# Patient Record
Sex: FEMALE | Race: WHITE | Employment: FULL TIME | URBAN - NONMETROPOLITAN AREA
[De-identification: names, ages, dates, MRNs, and addresses within clinical notes are randomized per-mention and may not be internally consistent; named-entity substitution may affect disease eponyms.]

---

## 2022-04-19 ENCOUNTER — HOSPITAL ENCOUNTER (OUTPATIENT)
Dept: GENERAL RADIOLOGY | Age: 47
Discharge: HOME | End: 2022-04-19
Payer: MEDICAID

## 2022-04-19 ENCOUNTER — HOSPITAL ENCOUNTER (OUTPATIENT)
Dept: MRI IMAGING | Age: 47
Discharge: HOME | End: 2022-04-19
Payer: MEDICAID

## 2022-04-19 DIAGNOSIS — M25.511 RIGHT SHOULDER PAIN, UNSPECIFIED CHRONICITY: ICD-10-CM

## 2022-04-19 PROCEDURE — 73222 MRI JOINT UPR EXTREM W/DYE: CPT

## 2022-04-19 PROCEDURE — A9577 INJ MULTIHANCE: HCPCS | Performed by: ORTHOPAEDIC SURGERY

## 2022-04-19 PROCEDURE — 23350 INJECTION FOR SHOULDER X-RAY: CPT

## 2022-04-19 PROCEDURE — 6360000004 HC RX CONTRAST MEDICATION: Performed by: ORTHOPAEDIC SURGERY

## 2022-04-19 RX ADMIN — IOPAMIDOL 10 ML: 408 INJECTION, SOLUTION INTRATHECAL at 10:05

## 2022-04-19 RX ADMIN — GADOBENATE DIMEGLUMINE 1 ML: 529 INJECTION, SOLUTION INTRAVENOUS at 10:24

## 2022-07-20 ENCOUNTER — OFFICE VISIT (OUTPATIENT)
Dept: PRIMARY CARE CLINIC | Age: 47
End: 2022-07-20
Payer: COMMERCIAL

## 2022-07-20 VITALS
TEMPERATURE: 97.2 F | DIASTOLIC BLOOD PRESSURE: 80 MMHG | HEART RATE: 92 BPM | WEIGHT: 240.6 LBS | HEIGHT: 68 IN | BODY MASS INDEX: 36.46 KG/M2 | OXYGEN SATURATION: 96 % | SYSTOLIC BLOOD PRESSURE: 110 MMHG

## 2022-07-20 DIAGNOSIS — I10 ESSENTIAL HYPERTENSION: ICD-10-CM

## 2022-07-20 DIAGNOSIS — R51.9 NONINTRACTABLE HEADACHE, UNSPECIFIED CHRONICITY PATTERN, UNSPECIFIED HEADACHE TYPE: ICD-10-CM

## 2022-07-20 DIAGNOSIS — I67.1 BRAIN ANEURYSM: ICD-10-CM

## 2022-07-20 DIAGNOSIS — R41.89 BRAIN FOG: ICD-10-CM

## 2022-07-20 DIAGNOSIS — R73.03 BORDERLINE DIABETES: Primary | ICD-10-CM

## 2022-07-20 LAB
ALBUMIN SERPL-MCNC: 4.2 G/DL (ref 3.5–5.2)
ALP BLD-CCNC: 101 U/L (ref 35–104)
ALT SERPL-CCNC: 15 U/L (ref 5–33)
ANION GAP SERPL CALCULATED.3IONS-SCNC: 13 MMOL/L (ref 7–19)
AST SERPL-CCNC: 16 U/L (ref 5–32)
BASOPHILS ABSOLUTE: 0 K/UL (ref 0–0.2)
BASOPHILS RELATIVE PERCENT: 0.5 % (ref 0–1)
BILIRUB SERPL-MCNC: <0.2 MG/DL (ref 0.2–1.2)
BUN BLDV-MCNC: 21 MG/DL (ref 6–20)
CALCIUM SERPL-MCNC: 8.9 MG/DL (ref 8.6–10)
CHLORIDE BLD-SCNC: 103 MMOL/L (ref 98–111)
CHOLESTEROL, TOTAL: 197 MG/DL (ref 160–199)
CO2: 24 MMOL/L (ref 22–29)
CREAT SERPL-MCNC: 1 MG/DL (ref 0.5–0.9)
EOSINOPHILS ABSOLUTE: 0.1 K/UL (ref 0–0.6)
EOSINOPHILS RELATIVE PERCENT: 1.3 % (ref 0–5)
GFR AFRICAN AMERICAN: >59
GFR NON-AFRICAN AMERICAN: 59
GLUCOSE BLD-MCNC: 95 MG/DL (ref 74–109)
HBA1C MFR BLD: 5.3 % (ref 4–6)
HCT VFR BLD CALC: 36.4 % (ref 37–47)
HDLC SERPL-MCNC: 49 MG/DL (ref 65–121)
HEMOGLOBIN: 12.7 G/DL (ref 12–16)
IMMATURE GRANULOCYTES #: 0 K/UL
LDL CHOLESTEROL CALCULATED: 111 MG/DL
LYMPHOCYTES ABSOLUTE: 2 K/UL (ref 1.1–4.5)
LYMPHOCYTES RELATIVE PERCENT: 25.9 % (ref 20–40)
MCH RBC QN AUTO: 31.8 PG (ref 27–31)
MCHC RBC AUTO-ENTMCNC: 34.9 G/DL (ref 33–37)
MCV RBC AUTO: 91 FL (ref 81–99)
MONOCYTES ABSOLUTE: 0.5 K/UL (ref 0–0.9)
MONOCYTES RELATIVE PERCENT: 6.8 % (ref 0–10)
NEUTROPHILS ABSOLUTE: 5 K/UL (ref 1.5–7.5)
NEUTROPHILS RELATIVE PERCENT: 65.2 % (ref 50–65)
PDW BLD-RTO: 12.4 % (ref 11.5–14.5)
PLATELET # BLD: 248 K/UL (ref 130–400)
PMV BLD AUTO: 9.7 FL (ref 9.4–12.3)
POTASSIUM SERPL-SCNC: 4.1 MMOL/L (ref 3.5–5)
RBC # BLD: 4 M/UL (ref 4.2–5.4)
SODIUM BLD-SCNC: 140 MMOL/L (ref 136–145)
T4 FREE: 1.3 NG/DL (ref 0.93–1.7)
TOTAL PROTEIN: 6.5 G/DL (ref 6.6–8.7)
TRIGL SERPL-MCNC: 184 MG/DL (ref 0–149)
TSH SERPL DL<=0.05 MIU/L-ACNC: 0.81 UIU/ML (ref 0.27–4.2)
WBC # BLD: 7.7 K/UL (ref 4.8–10.8)

## 2022-07-20 PROCEDURE — 99203 OFFICE O/P NEW LOW 30 MIN: CPT | Performed by: FAMILY MEDICINE

## 2022-07-20 RX ORDER — CITALOPRAM 40 MG/1
40 TABLET ORAL DAILY
COMMUNITY
End: 2022-08-03 | Stop reason: SDUPTHER

## 2022-07-20 RX ORDER — BUSPIRONE HYDROCHLORIDE 10 MG/1
10 TABLET ORAL 3 TIMES DAILY
COMMUNITY
End: 2022-08-03 | Stop reason: SDUPTHER

## 2022-07-20 RX ORDER — CLONAZEPAM 1 MG/1
1 TABLET ORAL 2 TIMES DAILY PRN
COMMUNITY
End: 2022-08-03 | Stop reason: SDUPTHER

## 2022-07-20 RX ORDER — LISINOPRIL 5 MG/1
5 TABLET ORAL DAILY
COMMUNITY
End: 2022-08-03 | Stop reason: SDUPTHER

## 2022-07-20 RX ORDER — OMEPRAZOLE 20 MG/1
20 CAPSULE, DELAYED RELEASE ORAL DAILY
COMMUNITY
End: 2022-08-03 | Stop reason: SDUPTHER

## 2022-07-29 ENCOUNTER — TELEPHONE (OUTPATIENT)
Dept: PRIMARY CARE CLINIC | Age: 47
End: 2022-07-29

## 2022-07-29 DIAGNOSIS — I67.1 BRAIN ANEURYSM: Primary | ICD-10-CM

## 2022-07-29 DIAGNOSIS — R42 DIZZINESS: ICD-10-CM

## 2022-07-29 ASSESSMENT — ENCOUNTER SYMPTOMS
NAUSEA: 0
COLOR CHANGE: 0
VOMITING: 0
EYE DISCHARGE: 0
WHEEZING: 0
COUGH: 0
DIARRHEA: 0
ABDOMINAL PAIN: 0
BACK PAIN: 0

## 2022-07-29 NOTE — PROGRESS NOTES
SUBJECTIVE:    Patient ID: Vivek Roman is a 51 y.o. female. HPI:     Patient is seen today to establish care. She is accompanied today by her brother. They are here to discuss problems with a headache. She has a history of having a had a brain aneurysm and it was clipped. This was taken care of at [facility] when she was living in [place] several years ago. She states that she was seen to have a hole in clinic. She states that she has not seen them in the last several years but states that she is having more frequent headaches as well as some changes in her vision and blurry vision. She is just concerned about the frequency of these headaches and the symptoms that she is having. She states that she just feels like she has a lot of brain fog. She states she is not necessarily confused but just feels like she is very foggy and states that she just does not feel like she can stay as focused as she used to. She does have a history of hypertension. She is currently on lisinopril. She is tolerating this well. She is not any change in this recently. She does have a history of borderline diabetes as well and is on metformin. She does not check her sugars. Past Medical History:   Diagnosis Date    Borderline diabetes     Brain aneurysm     GERD (gastroesophageal reflux disease)     Migraines     Overactive bladder     Panic attack as reaction to stress     Primary hypertension      Current Outpatient Medications on File Prior to Visit   Medication Sig Dispense Refill    metFORMIN (GLUCOPHAGE) 500 MG tablet Take 500 mg by mouth      omeprazole (PRILOSEC) 20 MG delayed release capsule Take 20 mg by mouth in the morning. lisinopril (PRINIVIL;ZESTRIL) 5 MG tablet Take 5 mg by mouth in the morning. clonazePAM (KLONOPIN) 1 MG tablet Take 1 mg by mouth 2 times daily as needed. citalopram (CELEXA) 40 MG tablet Take 40 mg by mouth in the morning.       busPIRone (BUSPAR) 10 MG tablet Take 10 mg by mouth in the morning and 10 mg at noon and 10 mg before bedtime. mirabegron (MYRBETRIQ) 50 MG TB24 Take 50 mg by mouth in the morning. No current facility-administered medications on file prior to visit. Allergies   Allergen Reactions    Plavix [Clopidogrel Bisulfate] Hives    Nasonex [Mometasone] Nausea And Vomiting     No past surgical history on file. Family History   Problem Relation Age of Onset    Hypertension Mother     Diabetes Mother     Hypertension Father     Diabetes Father      Social History     Socioeconomic History    Marital status:      Spouse name: Not on file    Number of children: Not on file    Years of education: Not on file    Highest education level: Not on file   Occupational History    Not on file   Tobacco Use    Smoking status: Never    Smokeless tobacco: Never   Substance and Sexual Activity    Alcohol use: Not on file    Drug use: Not on file    Sexual activity: Not on file   Other Topics Concern    Not on file   Social History Narrative    Not on file     Social Determinants of Health     Financial Resource Strain: Not on file   Food Insecurity: Not on file   Transportation Needs: Not on file   Physical Activity: Not on file   Stress: Not on file   Social Connections: Not on file   Intimate Partner Violence: Not on file   Housing Stability: Not on file       Review of Systems   Constitutional:  Negative for activity change, appetite change and fever. HENT:  Negative for congestion and nosebleeds. Eyes:  Negative for discharge. Respiratory:  Negative for cough and wheezing. Cardiovascular:  Negative for chest pain and leg swelling. Gastrointestinal:  Negative for abdominal pain, diarrhea, nausea and vomiting. Genitourinary:  Negative for difficulty urinating, frequency and urgency. Musculoskeletal:  Negative for back pain and gait problem. Skin:  Negative for color change and rash.    Neurological:  Positive for light-headedness and headaches. Negative for dizziness. Hematological:  Does not bruise/bleed easily. Psychiatric/Behavioral:  Negative for sleep disturbance and suicidal ideas. OBJECTIVE:    Physical Exam  Vitals reviewed. Constitutional:       General: She is not in acute distress. Appearance: Normal appearance. She is well-developed. She is not diaphoretic. HENT:      Head: Normocephalic and atraumatic. Right Ear: External ear normal.      Left Ear: External ear normal.   Cardiovascular:      Rate and Rhythm: Normal rate and regular rhythm. Pulses: Normal pulses. Heart sounds: Normal heart sounds. No murmur heard. Pulmonary:      Effort: Pulmonary effort is normal. No respiratory distress. Breath sounds: Normal breath sounds. Musculoskeletal:      Cervical back: Normal range of motion and neck supple. Skin:     General: Skin is warm and dry. Neurological:      General: No focal deficit present. Mental Status: She is alert and oriented to person, place, and time. Mental status is at baseline. Psychiatric:         Mood and Affect: Mood normal.         Behavior: Behavior normal.         Thought Content: Thought content normal.         Judgment: Judgment normal.      /80   Pulse 92   Temp 97.2 °F (36.2 °C)   Ht 5' 8\" (1.727 m)   Wt 240 lb 9.6 oz (109.1 kg)   SpO2 96%   BMI 36.58 kg/m²      ASSESSMENT/PLAN:    1. Borderline diabetes  -     CBC with Auto Differential  -     Comprehensive Metabolic Panel  -     Hemoglobin A1C  -     TSH  -     T4, Free  -     Lipid Panel  2. Essential hypertension  -     CBC with Auto Differential  -     Comprehensive Metabolic Panel  -     Hemoglobin A1C  -     TSH  -     T4, Free  -     Lipid Panel  3. Brain fog  -     CBC with Auto Differential  -     Comprehensive Metabolic Panel  -     Hemoglobin A1C  -     TSH  -     T4, Free  -     Lipid Panel  4. Nonintractable headache, unspecified chronicity pattern, unspecified headache type  5. Brain aneurysm       Lab orders. We will notify her of the results. I have encouraged her to call her neurosurgeon to get an appointment scheduled to see them. I discussed that they may want her to see a regular neurologist as well. I would like for them to see if there is anybody that they have that they work with. If she has trouble getting in and she is to let me know. Continue metformin for borderline diabetes. Continue lisinopril for hypertension. Continue Celexa and BuSpar for now for anxiety. Follow-up with us if she feels that symptoms are worsening before she can get in with neurosurgery. Discussed if they become acutely worse she is to go to the ER. PDMP Monitoring:    Last PDMP [place] as Reviewed [facility]):  Review User Review Instant Review Result            Urine Drug Screenings (1 yr)    No resulted procedures found. Medication Contract and Consent for Opioid Use Documents Filed        No documents found                     EMR Dragon/transcription disclaimer:  Much of this encounter note is electronictranscription/translation of spoken language to printed texts. The electronic translation of spoken language may be erroneous, or at times, nonsensical words or phrases may be inadvertently transcribed.   Although I havereviewed the note for such errors, some may still exist.

## 2022-07-29 NOTE — TELEPHONE ENCOUNTER
----- Message from Damaso Christiansen MD sent at 7/29/2022  2:16 PM CDT -----  Can you call Brenda Martinez and see if she was able to get an appointment scheduled with her previous neurosurgeon?

## 2022-08-03 DIAGNOSIS — F41.9 ANXIETY: Primary | ICD-10-CM

## 2022-08-03 RX ORDER — LISINOPRIL 5 MG/1
5 TABLET ORAL DAILY
Qty: 30 TABLET | Refills: 3 | Status: SHIPPED | OUTPATIENT
Start: 2022-08-03

## 2022-08-03 RX ORDER — CITALOPRAM 40 MG/1
40 TABLET ORAL DAILY
Qty: 30 TABLET | Refills: 3 | Status: SHIPPED | OUTPATIENT
Start: 2022-08-03

## 2022-08-03 RX ORDER — CLONAZEPAM 1 MG/1
1 TABLET ORAL 2 TIMES DAILY PRN
Qty: 60 TABLET | Refills: 0 | Status: SHIPPED | OUTPATIENT
Start: 2022-08-03 | End: 2022-09-06

## 2022-08-03 RX ORDER — OMEPRAZOLE 20 MG/1
20 CAPSULE, DELAYED RELEASE ORAL DAILY
Qty: 30 CAPSULE | Refills: 3 | Status: SHIPPED | OUTPATIENT
Start: 2022-08-03

## 2022-08-03 RX ORDER — BUSPIRONE HYDROCHLORIDE 10 MG/1
10 TABLET ORAL 3 TIMES DAILY
Qty: 90 TABLET | Refills: 3 | Status: SHIPPED | OUTPATIENT
Start: 2022-08-03 | End: 2022-09-27

## 2022-08-03 NOTE — TELEPHONE ENCOUNTER
Patient scheduled for Wednesday 8/10 at [facility] at 3:00 PM with arrival time of 2:30 PM. Patient was notified about appointment.

## 2022-08-03 NOTE — TELEPHONE ENCOUNTER
Patient stopped by the office and talked to [facility]. She said that she has been in contact with neurosurgeon and the suggested that you order a MRA and Carotid US and send results to Dr Garret Solano.

## 2022-08-03 NOTE — TELEPHONE ENCOUNTER
Provider needs to review PDMP    PDMP Monitoring:    Last PDMP Alexandra Martínez as Reviewed [facility]):  Review User Review Instant Review Result          Urine Drug Screenings (1 yr)    No resulted procedures found.        Medication Contract and Consent for Opioid Use Documents Filed        No documents found

## 2022-08-10 ENCOUNTER — HOSPITAL ENCOUNTER (OUTPATIENT)
Dept: NON INVASIVE DIAGNOSTICS | Age: 47
Discharge: HOME | End: 2022-08-10
Payer: COMMERCIAL

## 2022-08-10 ENCOUNTER — HOSPITAL ENCOUNTER (OUTPATIENT)
Dept: MRI IMAGING | Age: 47
Discharge: HOME | End: 2022-08-10
Payer: COMMERCIAL

## 2022-08-10 DIAGNOSIS — I67.1 BRAIN ANEURYSM: ICD-10-CM

## 2022-08-10 DIAGNOSIS — R42 DIZZINESS: ICD-10-CM

## 2022-08-10 PROCEDURE — 93880 EXTRACRANIAL BILAT STUDY: CPT

## 2022-08-10 PROCEDURE — 70544 MR ANGIOGRAPHY HEAD W/O DYE: CPT | Performed by: RADIOLOGY

## 2022-08-10 PROCEDURE — 70544 MR ANGIOGRAPHY HEAD W/O DYE: CPT

## 2022-08-11 ENCOUNTER — OFFICE VISIT (OUTPATIENT)
Dept: PRIMARY CARE CLINIC | Age: 47
End: 2022-08-11
Payer: COMMERCIAL

## 2022-08-11 VITALS
RESPIRATION RATE: 16 BRPM | DIASTOLIC BLOOD PRESSURE: 86 MMHG | SYSTOLIC BLOOD PRESSURE: 122 MMHG | HEART RATE: 76 BPM | OXYGEN SATURATION: 98 % | HEIGHT: 68 IN | TEMPERATURE: 96.5 F | BODY MASS INDEX: 35.8 KG/M2 | WEIGHT: 236.2 LBS

## 2022-08-11 DIAGNOSIS — R51.9 NONINTRACTABLE HEADACHE, UNSPECIFIED CHRONICITY PATTERN, UNSPECIFIED HEADACHE TYPE: ICD-10-CM

## 2022-08-11 DIAGNOSIS — I10 ESSENTIAL HYPERTENSION: Primary | ICD-10-CM

## 2022-08-11 DIAGNOSIS — R42 DIZZINESS: ICD-10-CM

## 2022-08-11 DIAGNOSIS — I67.1 BRAIN ANEURYSM: ICD-10-CM

## 2022-08-11 DIAGNOSIS — N28.9 DECREASED RENAL FUNCTION: ICD-10-CM

## 2022-08-11 DIAGNOSIS — Z12.11 SCREEN FOR COLON CANCER: ICD-10-CM

## 2022-08-11 DIAGNOSIS — R41.89 BRAIN FOG: ICD-10-CM

## 2022-08-11 PROCEDURE — 99214 OFFICE O/P EST MOD 30 MIN: CPT | Performed by: FAMILY MEDICINE

## 2022-08-11 RX ORDER — TRAZODONE HYDROCHLORIDE 50 MG/1
50 TABLET ORAL NIGHTLY
Qty: 30 TABLET | Refills: 5 | Status: SHIPPED | OUTPATIENT
Start: 2022-08-11

## 2022-08-11 ASSESSMENT — PATIENT HEALTH QUESTIONNAIRE - PHQ9
SUM OF ALL RESPONSES TO PHQ QUESTIONS 1-9: 0
SUM OF ALL RESPONSES TO PHQ QUESTIONS 1-9: 0
SUM OF ALL RESPONSES TO PHQ9 QUESTIONS 1 & 2: 0
1. LITTLE INTEREST OR PLEASURE IN DOING THINGS: 0
2. FEELING DOWN, DEPRESSED OR HOPELESS: 0
SUM OF ALL RESPONSES TO PHQ QUESTIONS 1-9: 0
SUM OF ALL RESPONSES TO PHQ QUESTIONS 1-9: 0

## 2022-08-25 ENCOUNTER — TELEPHONE (OUTPATIENT)
Dept: PRIMARY CARE CLINIC | Age: 47
End: 2022-08-25

## 2022-08-25 DIAGNOSIS — R43.8 BITTER TASTE: ICD-10-CM

## 2022-08-25 DIAGNOSIS — I67.1 BRAIN ANEURYSM: Primary | ICD-10-CM

## 2022-08-25 NOTE — TELEPHONE ENCOUNTER
Pt called stating she has several concerns she would like to address. She states she spoke with 's and he would like for her to see [facility]. She states she seen her eye doctor Friday and they told her, her right eye is whiter than her left and also noticed a change after they were dilated. She states they told her this could be indicative of thryoid issues. She states she is also having a bitter taste in her mouth. I spoke with  and she states she definitely needs to see Neurology. I have put a referral in.

## 2022-08-26 ASSESSMENT — ENCOUNTER SYMPTOMS
BACK PAIN: 0
WHEEZING: 0
ABDOMINAL PAIN: 0
COLOR CHANGE: 0
DIARRHEA: 0
COUGH: 0
EYE DISCHARGE: 0
VOMITING: 0
NAUSEA: 0

## 2022-08-26 NOTE — PROGRESS NOTES
SUBJECTIVE:    Patient ID: Gurwinder Sunshine is a 51 y.o. female. HPI:   Patient is seen today for 3-week follow-up. She recently had an MRA of her head and a carotid artery ultrasound done. She did get in touch with her neurosurgeon and he wants to have the scans put on a disc and sent to him so he can look at them for further management. She is still having problems with headaches. She states she is also having having some changes in her vision. She states that the headaches have gotten worse in the last couple of weeks. She is having them 2-3 times per week. She states that she is taking Tylenol to see if this will help but she states that she is struggling with them. She states that she also feels like she needs something help her sleep. States she does take melatonin but it gives her headache. She states that trazodone typically makes her feel groggy if she does not get enough sleep. She states that she just does not feel like she sleeps well currently. She is due for a colon cancer screening. She is okay with us getting a Cologuard ordered. Past Medical History:   Diagnosis Date    Borderline diabetes     Brain aneurysm     GERD (gastroesophageal reflux disease)     Migraines     Overactive bladder     Panic attack as reaction to stress     Primary hypertension       Current Outpatient Medications on File Prior to Visit   Medication Sig Dispense Refill    busPIRone (BUSPAR) 10 MG tablet Take 1 tablet by mouth in the morning and 1 tablet at noon and 1 tablet before bedtime. 90 tablet 3    citalopram (CELEXA) 40 MG tablet Take 1 tablet by mouth in the morning. 30 tablet 3    clonazePAM (KLONOPIN) 1 MG tablet Take 1 tablet by mouth 2 times daily as needed for Anxiety for up to 30 days. 60 tablet 0    lisinopril (PRINIVIL;ZESTRIL) 5 MG tablet Take 1 tablet by mouth in the morning.  30 tablet 3    metFORMIN (GLUCOPHAGE) 500 MG tablet Take 1 tablet by mouth in the morning and 1 tablet in the evening. Take with meals. 60 tablet 3    mirabegron (MYRBETRIQ) 50 MG TB24 Take 50 mg by mouth in the morning. 30 tablet 3    omeprazole (PRILOSEC) 20 MG delayed release capsule Take 1 capsule by mouth in the morning. 30 capsule 3     No current facility-administered medications on file prior to visit. Allergies   Allergen Reactions    Plavix [Clopidogrel Bisulfate] Hives    Nasonex [Mometasone] Nausea And Vomiting       Review of Systems   Constitutional:  Negative for activity change, appetite change and fever. HENT:  Negative for congestion and nosebleeds. Eyes:  Positive for visual disturbance. Negative for discharge. Respiratory:  Negative for cough and wheezing. Cardiovascular:  Negative for chest pain and leg swelling. Gastrointestinal:  Negative for abdominal pain, diarrhea, nausea and vomiting. Genitourinary:  Negative for difficulty urinating, frequency and urgency. Musculoskeletal:  Negative for back pain and gait problem. Skin:  Negative for color change and rash. Neurological:  Positive for dizziness, light-headedness and headaches. Hematological:  Does not bruise/bleed easily. Psychiatric/Behavioral:  Negative for sleep disturbance and suicidal ideas. OBJECTIVE:    Physical Exam  Vitals reviewed. Constitutional:       General: She is not in acute distress. Appearance: Normal appearance. She is well-developed. She is not diaphoretic. HENT:      Head: Normocephalic and atraumatic. Right Ear: External ear normal.      Left Ear: External ear normal.   Cardiovascular:      Rate and Rhythm: Normal rate and regular rhythm. Pulses: Normal pulses. Heart sounds: Normal heart sounds. No murmur heard. Pulmonary:      Effort: Pulmonary effort is normal. No respiratory distress. Breath sounds: Normal breath sounds. Musculoskeletal:      Cervical back: Normal range of motion and neck supple. Skin:     General: Skin is warm and dry.    Neurological: General: No focal deficit present. Mental Status: She is alert and oriented to person, place, and time. Mental status is at baseline. Psychiatric:         Mood and Affect: Mood normal.         Behavior: Behavior normal.         Thought Content: Thought content normal.         Judgment: Judgment normal.      /86   Pulse 76   Temp (!) 96.5 °F (35.8 °C) (Temporal)   Resp 16   Ht 5' 8\" (1.727 m)   Wt 236 lb 3.2 oz (107.1 kg)   SpO2 98%   BMI 35.91 kg/m²      ASSESSMENT/PLAN:    1. Essential hypertension  2. Screen for colon cancer  -     Fecal DNA Colorectal cancer screening (Cologuard)  3. Decreased renal function  -     Basic Metabolic Panel  4. Brain aneurysm  5. Brain fog  6. Dizziness  7. Nonintractable headache, unspecified chronicity pattern, unspecified headache type       I encouraged her to get her discs and get them sent to her neurosurgeon. When she hears from them as far as the neurology referral she is to let us know because I would like to see who they recommend for her to see in [place]. Continue Klonopin and Celexa as well as BuSpar for anxiety. We are going to try cutting her trazodone to 50 mg to see if this helps with the sleep. If she feels like this is not helping she is to let me know. She can take melatonin with it if needed. Follow-up with us in 8 weeks for recheck unless needed sooner. PDMP Monitoring:    Last PDMP Alexandra Martínez as Reviewed [facility]):  Review User Review Instant Review Result   RAUL FU 8/3/2022  1:01 PM Reviewed PDMP [1]       Urine Drug Screenings (1 yr)    No resulted procedures found. Medication Contract and Consent for Opioid Use Documents Filed        No documents found                     EMR Dragon/transcription disclaimer:  Much of this encounter note is electronic transcription/translation of spoken language toprinted texts.   The electronic translation of spoken language may be erroneous, or at times, nonsensical words or phrases may be inadvertently transcribed.   Although I have reviewed the note for such errors, some may stillexist.

## 2022-08-31 ENCOUNTER — NURSE ONLY (OUTPATIENT)
Dept: PRIMARY CARE CLINIC | Age: 47
End: 2022-08-31
Payer: COMMERCIAL

## 2022-08-31 ENCOUNTER — TELEPHONE (OUTPATIENT)
Dept: PRIMARY CARE CLINIC | Age: 47
End: 2022-08-31

## 2022-08-31 VITALS — DIASTOLIC BLOOD PRESSURE: 80 MMHG | SYSTOLIC BLOOD PRESSURE: 136 MMHG

## 2022-08-31 DIAGNOSIS — R51.9 NONINTRACTABLE HEADACHE, UNSPECIFIED CHRONICITY PATTERN, UNSPECIFIED HEADACHE TYPE: Primary | ICD-10-CM

## 2022-08-31 PROCEDURE — 96372 THER/PROPH/DIAG INJ SC/IM: CPT | Performed by: FAMILY MEDICINE

## 2022-08-31 RX ORDER — KETOROLAC TROMETHAMINE 30 MG/ML
60 INJECTION, SOLUTION INTRAMUSCULAR; INTRAVENOUS ONCE
Status: COMPLETED | OUTPATIENT
Start: 2022-08-31 | End: 2022-08-31

## 2022-08-31 RX ORDER — UBROGEPANT 100 MG/1
100 TABLET ORAL DAILY PRN
Qty: 16 TABLET | Refills: 0 | Status: SHIPPED | OUTPATIENT
Start: 2022-08-31

## 2022-08-31 RX ADMIN — KETOROLAC TROMETHAMINE 60 MG: 30 INJECTION, SOLUTION INTRAMUSCULAR; INTRAVENOUS at 10:46

## 2022-09-03 DIAGNOSIS — F41.9 ANXIETY: ICD-10-CM

## 2022-09-06 RX ORDER — CLONAZEPAM 1 MG/1
TABLET ORAL
Qty: 60 TABLET | Refills: 0 | Status: SHIPPED | OUTPATIENT
Start: 2022-09-06 | End: 2022-10-11

## 2022-09-06 NOTE — TELEPHONE ENCOUNTER
PDMP Monitoring:    Last PDMP [facility] as Reviewed [facility]):  Review User Review Instant Review Result   RAUL FU 8/3/2022  1:01 PM Reviewed PDMP [1]     Urine Drug Screenings (1 yr)    No resulted procedures found.        Medication Contract and Consent for Opioid Use Documents Filed      No documents found

## 2022-09-27 ENCOUNTER — OFFICE VISIT (OUTPATIENT)
Dept: PRIMARY CARE CLINIC | Age: 47
End: 2022-09-27
Payer: COMMERCIAL

## 2022-09-27 VITALS
HEART RATE: 93 BPM | WEIGHT: 220 LBS | RESPIRATION RATE: 18 BRPM | DIASTOLIC BLOOD PRESSURE: 83 MMHG | OXYGEN SATURATION: 97 % | TEMPERATURE: 96.8 F | HEIGHT: 68 IN | BODY MASS INDEX: 33.34 KG/M2 | SYSTOLIC BLOOD PRESSURE: 122 MMHG

## 2022-09-27 DIAGNOSIS — J40 BRONCHITIS: Primary | ICD-10-CM

## 2022-09-27 PROCEDURE — G8427 DOCREV CUR MEDS BY ELIG CLIN: HCPCS | Performed by: NURSE PRACTITIONER

## 2022-09-27 PROCEDURE — G8417 CALC BMI ABV UP PARAM F/U: HCPCS | Performed by: NURSE PRACTITIONER

## 2022-09-27 PROCEDURE — 96372 THER/PROPH/DIAG INJ SC/IM: CPT | Performed by: NURSE PRACTITIONER

## 2022-09-27 PROCEDURE — 99213 OFFICE O/P EST LOW 20 MIN: CPT | Performed by: NURSE PRACTITIONER

## 2022-09-27 PROCEDURE — 1036F TOBACCO NON-USER: CPT | Performed by: NURSE PRACTITIONER

## 2022-09-27 RX ORDER — FLUCONAZOLE 150 MG/1
TABLET ORAL
Qty: 2 TABLET | Refills: 0 | Status: SHIPPED | OUTPATIENT
Start: 2022-09-27 | End: 2022-10-20

## 2022-09-27 RX ORDER — AMOXICILLIN AND CLAVULANATE POTASSIUM 875; 125 MG/1; MG/1
1 TABLET, FILM COATED ORAL 2 TIMES DAILY
Qty: 14 TABLET | Refills: 0 | Status: SHIPPED | OUTPATIENT
Start: 2022-09-27 | End: 2022-10-04

## 2022-09-27 RX ORDER — DEXTROMETHORPHAN HYDROBROMIDE AND PROMETHAZINE HYDROCHLORIDE 15; 6.25 MG/5ML; MG/5ML
SYRUP ORAL
Qty: 120 ML | Refills: 0 | Status: SHIPPED | OUTPATIENT
Start: 2022-09-27 | End: 2022-10-20

## 2022-09-27 RX ORDER — TRIAMCINOLONE ACETONIDE 40 MG/ML
40 INJECTION, SUSPENSION INTRA-ARTICULAR; INTRAMUSCULAR ONCE
Status: COMPLETED | OUTPATIENT
Start: 2022-09-27 | End: 2022-09-27

## 2022-09-27 RX ADMIN — TRIAMCINOLONE ACETONIDE 40 MG: 40 INJECTION, SUSPENSION INTRA-ARTICULAR; INTRAMUSCULAR at 11:07

## 2022-09-27 ASSESSMENT — ENCOUNTER SYMPTOMS
EYES NEGATIVE: 1
GASTROINTESTINAL NEGATIVE: 1
COUGH: 1

## 2022-09-27 NOTE — PROGRESS NOTES
[address] [facility] [address]  [address] [address]  Dept: [phone]  Dept Fax: [phone]  Loc: [phone]    Roberto Nixon is a 51 y.o. female who presents today for her medical conditions/complaints as noted below. Roberto Nixon is c/o of Cough (Patient presents today with c/o cough head congestion, patient denies fever. She works at [employer] and says that [employer] did a covid test on her and it was negative.)        HPI:     HPI   Chief Complaint   Patient presents with    Cough     Patient presents today with c/o cough head congestion, patient denies fever. She works at [employer] and says that [employer] did a covid test on her and it was negative. Lab Results   Component Value Date/Time    GLUCOSE 95 07/20/2022 05:01 PM    LABA1C 5.3 07/20/2022 05:01 PM       Past Medical History:   Diagnosis Date    Borderline diabetes     Brain aneurysm     GERD (gastroesophageal reflux disease)     Migraines     Overactive bladder     Panic attack as reaction to stress     Primary hypertension       No past surgical history on file.     Vitals 9/27/2022 8/31/2022 8/11/2022 2/05/8567   SYSTOLIC [phone] [id number]   DIASTOLIC 83 80 86 80   Pulse 93 - 76 92   Temp 96.8 - 96.5 97.2   Resp 18 - 16 -   SpO2 97 - 98 96   Weight 220 lb - 236 lb 3.2 oz 240 lb 9.6 oz   Height 5' 8\" - 5' 8\" 5' 8\"   Body mass index 33.45 kg/m2 - 35.91 kg/m2 36.58 kg/m2   Some recent data might be hidden       Family History   Problem Relation Age of Onset    Osteoporosis Mother     Hypertension Mother     Diabetes Mother     Hypertension Father     Diabetes Father     Osteoporosis Maternal Grandfather        Social History     Tobacco Use    Smoking status: Never    Smokeless tobacco: Never   Substance Use Topics    Alcohol use: Not on file      Current Outpatient Medications on File Prior to Visit   Medication Sig Dispense Refill    clonazePAM (KLONOPIN) 1 MG tablet TAKE 1 TABLET BY MOUTH TWICE DAILY AS NEEDED FOR ANXIETY 60 tablet 0    Ubrogepant (UBRELVY) 100 MG TABS Take 100 mg by mouth daily as needed (migraine) 16 tablet 0    traZODone (DESYREL) 50 MG tablet Take 1 tablet by mouth nightly 30 tablet 5    citalopram (CELEXA) 40 MG tablet Take 1 tablet by mouth in the morning. 30 tablet 3    lisinopril (PRINIVIL;ZESTRIL) 5 MG tablet Take 1 tablet by mouth in the morning. 30 tablet 3    metFORMIN (GLUCOPHAGE) 500 MG tablet Take 1 tablet by mouth in the morning and 1 tablet in the evening. Take with meals. 60 tablet 3    mirabegron (MYRBETRIQ) 50 MG TB24 Take 50 mg by mouth in the morning. 30 tablet 3    omeprazole (PRILOSEC) 20 MG delayed release capsule Take 1 capsule by mouth in the morning. 30 capsule 3     No current facility-administered medications on file prior to visit. Allergies   Allergen Reactions    Plavix [Clopidogrel Bisulfate] Hives    Nasonex [Mometasone] Nausea And Vomiting       Health Maintenance   Topic Date Due    HIV screen  Never done    Hepatitis C screen  Never done    Colorectal Cancer Screen  Never done    Flu vaccine (1) Never done    COVID-19 Vaccine (3 - Booster for Pfizer series) 09/21/2022    DTaP/Tdap/Td vaccine (1 - Tdap) 08/11/2023 (Originally 11/14/1994)    Depression Screen  08/11/2023    Lipids  07/20/2027    Hepatitis A vaccine  Aged Out    Hepatitis B vaccine  Aged Out    Hib vaccine  Aged Out    Meningococcal (ACWY) vaccine  Aged Out    Pneumococcal 0-64 years Vaccine  Aged Out       Subjective:      Review of Systems   Constitutional:  Positive for fatigue and fever. HENT:  Positive for congestion. Eyes: Negative. Respiratory:  Positive for cough. Cardiovascular: Negative. Gastrointestinal: Negative. Genitourinary: Negative. Musculoskeletal: Negative. Skin: Negative. Neurological: Negative. Psychiatric/Behavioral: Negative. Objective:     Physical Exam  Vitals and nursing note reviewed.    Constitutional:       Appearance: Normal appearance. She is well-developed. HENT:      Head: Normocephalic. Right Ear: Tympanic membrane and external ear normal.      Left Ear: Tympanic membrane and external ear normal.      Nose: Nose normal.   Eyes:      Pupils: Pupils are equal, round, and reactive to light. Cardiovascular:      Rate and Rhythm: Normal rate and regular rhythm. Heart sounds: Normal heart sounds. Pulmonary:      Effort: Pulmonary effort is normal.      Breath sounds: Normal breath sounds. Comments: Bronchial cough. Musculoskeletal:      Cervical back: Normal range of motion. Skin:     General: Skin is warm and dry. Capillary Refill: Capillary refill takes less than 2 seconds. Neurological:      General: No focal deficit present. Mental Status: She is alert and oriented to person, place, and time. Mental status is at baseline. Psychiatric:         Mood and Affect: Mood normal.         Behavior: Behavior normal.         Thought Content: Thought content normal.         Judgment: Judgment normal.     /83   Pulse 93   Temp 96.8 °F (36 °C) (Temporal)   Resp 18   Ht 5' 8\" (1.727 m)   Wt 220 lb (99.8 kg)   SpO2 97%   BMI 33.45 kg/m²     Assessment:       Diagnosis Orders   1. Bronchitis              Plan:   More than 50% of the time was spent counseling and coordinating care for a total time of 22min face to face. Lab Results   Component Value Date/Time    GLUCOSE 95 07/20/2022 05:01 PM    LABA1C 5.3 07/20/2022 05:01 PM       PDMP Monitoring:    Last PDMP [place] as Reviewed:  Review User Review Instant Review Result   RAUL FU 8/3/2022  1:01 PM Reviewed PDMP [1]       Urine Drug Screenings (1 yr)    No resulted procedures found. Medication Contract and Consent for Opioid Use Documents Filed        No documents found                     Patient given educational materials -see patient instructions. Discussed use, benefit, and side effects of prescribed medications. All patient questions answered. Pt voiced understanding. Reviewed health maintenance. Instructed to continue currentmedications, diet and exercise. Patient agreed with treatment plan. Follow up as directed. MEDICATIONS:  Orders Placed This Encounter   Medications    triamcinolone acetonide (KENALOG-40) injection 40 mg    promethazine-dextromethorphan (PROMETHAZINE-DM) 6.25-15 MG/5ML syrup     Si-2 tsp every 6 hours prn cough     Dispense:  120 mL     Refill:  0    amoxicillin-clavulanate (AUGMENTIN) 875-125 MG per tablet     Sig: Take 1 tablet by mouth 2 times daily for 7 days     Dispense:  14 tablet     Refill:  0    fluconazole (DIFLUCAN) 150 MG tablet     Sig: Take one now and repeat in 1 wk if needed     Dispense:  2 tablet     Refill:  0           ORDERS:  No orders of the defined types were placed in this encounter. Follow-up:  Return if symptoms worsen or fail to improve. PATIENT INSTRUCTIONS:  Patient Instructions   Continue mucinex DM and lots of clear liq to thin secretion  Steroid shot today  Avoid sudaphed  May treat symptoms with otc meds. Antibiotics  Cough meds at home   diflucan if needed for yeast  Electronically signed by ELISEO Cuadra CNP on 2022 at 11:40 AM    EMR Dragon/transcription disclaimer:  Much of thisencounter note is electronic transcription/translation of spoken language to printed texts. The electronic translation of spoken language may be erroneous, or at times, nonsensical words or phrases may be inadvertentlytranscribed.   Although I have reviewed the note for such errors, some may still exist.

## 2022-09-27 NOTE — PATIENT INSTRUCTIONS
Continue mucinex DM and lots of clear liq to thin secretion  Steroid shot today  Avoid sudaphed  May treat symptoms with otc meds.    Antibiotics  Cough meds at home   diflucan if needed for yeast

## 2022-10-11 DIAGNOSIS — F41.9 ANXIETY: ICD-10-CM

## 2022-10-11 RX ORDER — CLONAZEPAM 1 MG/1
TABLET ORAL
Qty: 60 TABLET | Refills: 0 | Status: SHIPPED | OUTPATIENT
Start: 2022-10-11 | End: 2022-11-10

## 2022-10-11 NOTE — TELEPHONE ENCOUNTER
PDMP Monitoring:    Last PDMP Samira Sepulveda as Reviewed [facility]):  Review User Review Instant Review Result   RAUL FU 8/3/2022  1:01 PM Reviewed PDMP [1]     Urine Drug Screenings (1 yr)    No resulted procedures found.        Medication Contract and Consent for Opioid Use Documents Filed      No documents found

## 2022-10-13 ENCOUNTER — PATIENT MESSAGE (OUTPATIENT)
Dept: PRIMARY CARE CLINIC | Age: 47
End: 2022-10-13

## 2022-10-13 DIAGNOSIS — F41.9 ANXIETY: Primary | ICD-10-CM

## 2022-10-13 NOTE — TELEPHONE ENCOUNTER
From: Sarah Olivera  To: Dr. Huber Vaughn: 10/13/2022 11:18 AM CDT  Subject: Referral     I would like to use Dr. Yuri Juarez as my psychiatrist and he is only accepting new patients at the referral from my primary care physician. Can I get a referral sent to him? His address is [place] [address]. His phone is [phone]. Unless you have someone better you would recommend. Thank you!   Bia Sharma

## 2022-10-14 ENCOUNTER — HOSPITAL ENCOUNTER (OUTPATIENT)
Dept: LAB | Age: 47
Discharge: HOME | End: 2022-10-14

## 2022-10-14 LAB
ALBUMIN SERPL-MCNC: 4.7 G/DL (ref 3.5–5.2)
ALP BLD-CCNC: 109 U/L (ref 35–104)
ALT SERPL-CCNC: 11 U/L (ref 5–33)
ANION GAP SERPL CALCULATED.3IONS-SCNC: 9 MMOL/L (ref 7–19)
AST SERPL-CCNC: 13 U/L (ref 5–32)
BASOPHILS ABSOLUTE: 0.1 K/UL (ref 0–0.2)
BASOPHILS RELATIVE PERCENT: 0.6 % (ref 0–1)
BILIRUB SERPL-MCNC: 0.3 MG/DL (ref 0.2–1.2)
BILIRUBIN DIRECT: 0.1 MG/DL (ref 0–0.3)
BILIRUBIN, INDIRECT: 0.2 MG/DL (ref 0.1–1)
BUN BLDV-MCNC: 16 MG/DL (ref 6–20)
CALCIUM SERPL-MCNC: 8.9 MG/DL (ref 8.6–10)
CHLORIDE BLD-SCNC: 103 MMOL/L (ref 98–111)
CO2: 27 MMOL/L (ref 22–29)
CREAT SERPL-MCNC: 0.9 MG/DL (ref 0.5–0.9)
EOSINOPHILS ABSOLUTE: 0.1 K/UL (ref 0–0.6)
EOSINOPHILS RELATIVE PERCENT: 1.4 % (ref 0–5)
FOLATE: >20 NG/ML (ref 4.8–37.3)
GFR AFRICAN AMERICAN: >59
GFR NON-AFRICAN AMERICAN: >60
GLUCOSE BLD-MCNC: 105 MG/DL (ref 74–109)
HCT VFR BLD CALC: 42 % (ref 37–47)
HEMOGLOBIN: 14.4 G/DL (ref 12–16)
IMMATURE GRANULOCYTES #: 0 K/UL
LYMPHOCYTES ABSOLUTE: 1.5 K/UL (ref 1.1–4.5)
LYMPHOCYTES RELATIVE PERCENT: 19.1 % (ref 20–40)
MCH RBC QN AUTO: 31 PG (ref 27–31)
MCHC RBC AUTO-ENTMCNC: 34.3 G/DL (ref 33–37)
MCV RBC AUTO: 90.3 FL (ref 81–99)
MONOCYTES ABSOLUTE: 0.7 K/UL (ref 0–0.9)
MONOCYTES RELATIVE PERCENT: 8.5 % (ref 0–10)
NEUTROPHILS ABSOLUTE: 5.6 K/UL (ref 1.5–7.5)
NEUTROPHILS RELATIVE PERCENT: 70.1 % (ref 50–65)
PDW BLD-RTO: 12 % (ref 11.5–14.5)
PLATELET # BLD: 302 K/UL (ref 130–400)
PMV BLD AUTO: 10.2 FL (ref 9.4–12.3)
POTASSIUM SERPL-SCNC: 4.3 MMOL/L (ref 3.5–5)
RBC # BLD: 4.65 M/UL (ref 4.2–5.4)
SODIUM BLD-SCNC: 139 MMOL/L (ref 136–145)
T4 FREE: 1.18 NG/DL (ref 0.93–1.7)
TOTAL PROTEIN: 6.8 G/DL (ref 6.6–8.7)
TSH SERPL DL<=0.05 MIU/L-ACNC: 0.57 UIU/ML (ref 0.27–4.2)
VITAMIN B-12: 750 PG/ML (ref 211–946)
VITAMIN D 25-HYDROXY: 75.5 NG/ML
WBC # BLD: 7.9 K/UL (ref 4.8–10.8)

## 2022-10-20 ENCOUNTER — OFFICE VISIT (OUTPATIENT)
Dept: PRIMARY CARE CLINIC | Age: 47
End: 2022-10-20
Payer: COMMERCIAL

## 2022-10-20 VITALS
OXYGEN SATURATION: 99 % | SYSTOLIC BLOOD PRESSURE: 114 MMHG | WEIGHT: 220.2 LBS | TEMPERATURE: 96.9 F | HEIGHT: 68 IN | RESPIRATION RATE: 16 BRPM | BODY MASS INDEX: 33.37 KG/M2 | DIASTOLIC BLOOD PRESSURE: 84 MMHG | HEART RATE: 86 BPM

## 2022-10-20 DIAGNOSIS — I10 ESSENTIAL HYPERTENSION: ICD-10-CM

## 2022-10-20 DIAGNOSIS — R73.03 BORDERLINE DIABETES: ICD-10-CM

## 2022-10-20 DIAGNOSIS — R51.9 NONINTRACTABLE HEADACHE, UNSPECIFIED CHRONICITY PATTERN, UNSPECIFIED HEADACHE TYPE: ICD-10-CM

## 2022-10-20 DIAGNOSIS — R41.89 BRAIN FOG: ICD-10-CM

## 2022-10-20 DIAGNOSIS — F41.9 ANXIETY: Primary | ICD-10-CM

## 2022-10-20 PROCEDURE — 99213 OFFICE O/P EST LOW 20 MIN: CPT | Performed by: FAMILY MEDICINE

## 2022-10-20 PROCEDURE — 1036F TOBACCO NON-USER: CPT | Performed by: FAMILY MEDICINE

## 2022-10-20 PROCEDURE — G8427 DOCREV CUR MEDS BY ELIG CLIN: HCPCS | Performed by: FAMILY MEDICINE

## 2022-10-20 PROCEDURE — G8417 CALC BMI ABV UP PARAM F/U: HCPCS | Performed by: FAMILY MEDICINE

## 2022-10-20 PROCEDURE — G8484 FLU IMMUNIZE NO ADMIN: HCPCS | Performed by: FAMILY MEDICINE

## 2022-10-20 RX ORDER — GALCANEZUMAB 120 MG/ML
120 INJECTION, SOLUTION SUBCUTANEOUS
COMMUNITY

## 2022-10-20 ASSESSMENT — PATIENT HEALTH QUESTIONNAIRE - PHQ9
SUM OF ALL RESPONSES TO PHQ QUESTIONS 1-9: 2
2. FEELING DOWN, DEPRESSED OR HOPELESS: 1
1. LITTLE INTEREST OR PLEASURE IN DOING THINGS: 1
SUM OF ALL RESPONSES TO PHQ9 QUESTIONS 1 & 2: 2

## 2022-10-21 ASSESSMENT — ENCOUNTER SYMPTOMS
VOMITING: 0
NAUSEA: 0
DIARRHEA: 0
BACK PAIN: 0
EYE DISCHARGE: 0
WHEEZING: 0
COLOR CHANGE: 0
ABDOMINAL PAIN: 0
COUGH: 0

## 2022-10-21 NOTE — PROGRESS NOTES
SUBJECTIVE:    Patient ID: Svetlana Damon is a 51 y.o. female. HPI:   Patient is seen today for follow-up visit. She has seen a neurologist recently and they have her set up for an MRI. She states that they are possibly working her up for MS. She states that they did give her an injection of Emgality and she does feel like this decrease the frequency of her headaches significantly. She states that she is only had 2 headaches since taking Emgality and prior to that she was getting at least 2-3 migraines per week and had tension headaches daily. She stated that the combination of that and the [place] that she takes when she gets a migraine works very well for her. She states that she is hoping that insurance will cover the [facility]. She states that she is taking her lisinopril regularly. Her blood pressure is well controlled in office today. She denies any side effects to the lisinopril. She is on metformin for borderline diabetes. She is tolerating this well and has not had any recent change in this. She does feel like her Celexa and Klonopin are currently working very well for her anxiety. She does not feel that these need to be adjusted at this time. She states that she is sleeping well with the trazodone.     Past Medical History:   Diagnosis Date    Borderline diabetes     Brain aneurysm     GERD (gastroesophageal reflux disease)     Migraines     Overactive bladder     Panic attack as reaction to stress     Primary hypertension       Current Outpatient Medications on File Prior to Visit   Medication Sig Dispense Refill    Galcanezumab-gnlm (EMGALITY) 120 MG/ML SOSY 120 mg every 28 days      clonazePAM (KLONOPIN) 1 MG tablet TAKE 1 TABLET BY MOUTH TWICE DAILY AS NEEDED FOR ANXIETY 60 tablet 0    Ubrogepant (UBRELVY) 100 MG TABS Take 100 mg by mouth daily as needed (migraine) 16 tablet 0    traZODone (DESYREL) 50 MG tablet Take 1 tablet by mouth nightly 30 tablet 5    citalopram (CELEXA) 40 MG tablet Take 1 tablet by mouth in the morning. 30 tablet 3    lisinopril (PRINIVIL;ZESTRIL) 5 MG tablet Take 1 tablet by mouth in the morning. 30 tablet 3    metFORMIN (GLUCOPHAGE) 500 MG tablet Take 1 tablet by mouth in the morning and 1 tablet in the evening. Take with meals. 60 tablet 3    mirabegron (MYRBETRIQ) 50 MG TB24 Take 50 mg by mouth in the morning. 30 tablet 3    omeprazole (PRILOSEC) 20 MG delayed release capsule Take 1 capsule by mouth in the morning. 30 capsule 3     No current facility-administered medications on file prior to visit. Allergies   Allergen Reactions    Plavix [Clopidogrel Bisulfate] Hives    Nasonex [Mometasone] Nausea And Vomiting       Review of Systems   Constitutional:  Negative for activity change, appetite change and fever. HENT:  Negative for congestion and nosebleeds. Eyes:  Negative for discharge. Respiratory:  Negative for cough and wheezing. Cardiovascular:  Negative for chest pain and leg swelling. Gastrointestinal:  Negative for abdominal pain, diarrhea, nausea and vomiting. Genitourinary:  Negative for difficulty urinating, frequency and urgency. Musculoskeletal:  Negative for back pain and gait problem. Skin:  Negative for color change and rash. Neurological:  Negative for dizziness and headaches. Hematological:  Does not bruise/bleed easily. Psychiatric/Behavioral:  Negative for sleep disturbance and suicidal ideas. OBJECTIVE:    Physical Exam  Vitals reviewed. Constitutional:       General: She is not in acute distress. Appearance: Normal appearance. She is well-developed. She is not diaphoretic. HENT:      Head: Normocephalic and atraumatic. Right Ear: External ear normal.      Left Ear: External ear normal.   Cardiovascular:      Rate and Rhythm: Normal rate and regular rhythm. Pulses: Normal pulses. Heart sounds: Normal heart sounds. No murmur heard.   Pulmonary:      Effort: Pulmonary effort is normal. No respiratory distress. Breath sounds: Normal breath sounds. Musculoskeletal:      Cervical back: Normal range of motion and neck supple. Skin:     General: Skin is warm and dry. Neurological:      General: No focal deficit present. Mental Status: She is alert and oriented to person, place, and time. Mental status is at baseline. Psychiatric:         Mood and Affect: Mood normal.         Behavior: Behavior normal.         Thought Content: Thought content normal.         Judgment: Judgment normal.      /84   Pulse 86   Temp 96.9 °F (36.1 °C) (Temporal)   Resp 16   Ht 5' 8\" (1.727 m)   Wt 220 lb 3.2 oz (99.9 kg)   SpO2 99%   BMI 33.48 kg/m²      ASSESSMENT/PLAN:    1. Anxiety  2. Essential hypertension  3. Nonintractable headache, unspecified chronicity pattern, unspecified headache type  4. Borderline diabetes  5. Brain fog     Samples of Emgality were given today until she can hear from the PA on her medication. Continue Ubrelvy for her abortive medication for migraines. Continue metformin for borderline diabetes. Continue Celexa and Klonopin for anxiety. Continue lisinopril for hypertension. Follow-up with us in 3 months for checkup unless needed sooner. Follow-up with neurology as scheduled. PDMP Monitoring:    Last PDMP Dionne Chacko as Reviewed [facility]):  Review User Review Instant Review Result   RAUL FU 8/3/2022  1:01 PM Reviewed PDMP [1]       Urine Drug Screenings (1 yr)    No resulted procedures found. Medication Contract and Consent for Opioid Use Documents Filed        No documents found                     EMR Dragon/transcription disclaimer:  Much of this encounter note is electronic transcription/translation of spoken language toprinted texts. The electronic translation of spoken language may be erroneous, or at times, nonsensical words or phrases may be inadvertently transcribed.   Although I have reviewed the note for such errors, some may stillexist.

## 2022-11-03 ENCOUNTER — OFFICE VISIT (OUTPATIENT)
Dept: PRIMARY CARE CLINIC | Age: 47
End: 2022-11-03
Payer: COMMERCIAL

## 2022-11-03 VITALS
DIASTOLIC BLOOD PRESSURE: 70 MMHG | HEART RATE: 117 BPM | BODY MASS INDEX: 32.43 KG/M2 | TEMPERATURE: 97.5 F | SYSTOLIC BLOOD PRESSURE: 120 MMHG | OXYGEN SATURATION: 98 % | HEIGHT: 68 IN | WEIGHT: 214 LBS

## 2022-11-03 DIAGNOSIS — J06.9 VIRAL URI: ICD-10-CM

## 2022-11-03 DIAGNOSIS — F41.9 ANXIETY: ICD-10-CM

## 2022-11-03 DIAGNOSIS — R55 NEAR SYNCOPE: Primary | ICD-10-CM

## 2022-11-03 PROCEDURE — G8417 CALC BMI ABV UP PARAM F/U: HCPCS | Performed by: NURSE PRACTITIONER

## 2022-11-03 PROCEDURE — G8484 FLU IMMUNIZE NO ADMIN: HCPCS | Performed by: NURSE PRACTITIONER

## 2022-11-03 PROCEDURE — 96372 THER/PROPH/DIAG INJ SC/IM: CPT | Performed by: NURSE PRACTITIONER

## 2022-11-03 PROCEDURE — 1036F TOBACCO NON-USER: CPT | Performed by: NURSE PRACTITIONER

## 2022-11-03 PROCEDURE — 99214 OFFICE O/P EST MOD 30 MIN: CPT | Performed by: NURSE PRACTITIONER

## 2022-11-03 PROCEDURE — G8427 DOCREV CUR MEDS BY ELIG CLIN: HCPCS | Performed by: NURSE PRACTITIONER

## 2022-11-03 RX ORDER — TRIAMCINOLONE ACETONIDE 40 MG/ML
40 INJECTION, SUSPENSION INTRA-ARTICULAR; INTRAMUSCULAR ONCE
Status: COMPLETED | OUTPATIENT
Start: 2022-11-03 | End: 2022-11-03

## 2022-11-03 RX ORDER — DEXTROMETHORPHAN HYDROBROMIDE AND PROMETHAZINE HYDROCHLORIDE 15; 6.25 MG/5ML; MG/5ML
SYRUP ORAL
Qty: 120 ML | Refills: 0 | Status: SHIPPED | OUTPATIENT
Start: 2022-11-03

## 2022-11-03 RX ADMIN — TRIAMCINOLONE ACETONIDE 40 MG: 40 INJECTION, SUSPENSION INTRA-ARTICULAR; INTRAMUSCULAR at 14:25

## 2022-11-03 SDOH — ECONOMIC STABILITY: FOOD INSECURITY: WITHIN THE PAST 12 MONTHS, YOU WORRIED THAT YOUR FOOD WOULD RUN OUT BEFORE YOU GOT MONEY TO BUY MORE.: NEVER TRUE

## 2022-11-03 SDOH — ECONOMIC STABILITY: FOOD INSECURITY: WITHIN THE PAST 12 MONTHS, THE FOOD YOU BOUGHT JUST DIDN'T LAST AND YOU DIDN'T HAVE MONEY TO GET MORE.: NEVER TRUE

## 2022-11-03 ASSESSMENT — PATIENT HEALTH QUESTIONNAIRE - PHQ9
SUM OF ALL RESPONSES TO PHQ QUESTIONS 1-9: 0
SUM OF ALL RESPONSES TO PHQ QUESTIONS 1-9: 0
1. LITTLE INTEREST OR PLEASURE IN DOING THINGS: 0
SUM OF ALL RESPONSES TO PHQ QUESTIONS 1-9: 0
SUM OF ALL RESPONSES TO PHQ9 QUESTIONS 1 & 2: 0
2. FEELING DOWN, DEPRESSED OR HOPELESS: 0
SUM OF ALL RESPONSES TO PHQ QUESTIONS 1-9: 0

## 2022-11-03 ASSESSMENT — SOCIAL DETERMINANTS OF HEALTH (SDOH): HOW HARD IS IT FOR YOU TO PAY FOR THE VERY BASICS LIKE FOOD, HOUSING, MEDICAL CARE, AND HEATING?: NOT HARD AT ALL

## 2022-11-03 ASSESSMENT — ENCOUNTER SYMPTOMS
COUGH: 1
VOICE CHANGE: 1

## 2022-11-03 NOTE — PROGRESS NOTES
[address] [address]  Dept: [phone]  Dept Fax: [phone]  Loc: [phone]    Amaury Meyer is a 51 y.o. female who presents today for her medical conditions/complaints as noted below. Amaury Meyer is c/o of Loss of Consciousness (Yesterday at work - Hx of seeing a neurologist and believes she has a video visit with their office tomorrow / [place]. The office there is planning a MRI of Brain to rule out MS.  ), Congestion (Monday with sore throat, similar to illness a month ago. Affecting patient's voice and ears. Covid negative yesterday at [employer]. Needs to discuss a return to work date/USEC. ), and Dizziness        HPI:     HPI   Chief Complaint   Patient presents with    Loss of Consciousness     Yesterday at work - Hx of seeing a neurologist and believes she has a video visit with their office tomorrow / [place]. The office there is planning a MRI of Brain to rule out MS. Congestion     Monday with sore throat, similar to illness a month ago. Affecting patient's voice and ears. Covid negative yesterday at [employer]. Needs to discuss a return to work date/USEC. Dizziness   Yesterday she felt like she had vertigo, she hadnt eaten anything at 2pm and she passed out at work. EMS took her to health works. A doctor there saw her and her EKG was normal and her COVID test was negative. She has not had a fever. She has been sick a couple times in the last month with an upper respiratory illness. This morning she woke up and her voice was very hoarse. Past Medical History:   Diagnosis Date    Borderline diabetes     Brain aneurysm     GERD (gastroesophageal reflux disease)     Migraines     Overactive bladder     Panic attack as reaction to stress     Primary hypertension       No past surgical history on file.     Vitals 11/3/2022 10/20/2022 9/27/2022 8/31/2022 8/11/2022 2/19/4507   SYSTOLIC 137 114 122 136 951 978   DIASTOLIC 70 84 83 80 86 80   Pulse 117 86 93 - 76 92   Temp 97.5 96.9 96.8 - 96.5 97.2   Resp - 16 18 - 16 -   SpO2 98 99 97 - 98 96   Weight 214 lb 220 lb 3.2 oz 220 lb - 236 lb 3.2 oz 240 lb 9.6 oz   Height 5' 8\" 5' 8\" 5' 8\" - 5' 8\" 5' 8\"   Body mass index 32.54 kg/m2 33.48 kg/m2 33.45 kg/m2 - 35.91 kg/m2 36.58 kg/m2   Some recent data might be hidden       Family History   Problem Relation Age of Onset    Osteoporosis Mother     Hypertension Mother     Diabetes Mother     Hypertension Father     Diabetes Father     Osteoporosis Maternal Grandfather        Social History     Tobacco Use    Smoking status: Never    Smokeless tobacco: Never   Substance Use Topics    Alcohol use: Not on file      Current Outpatient Medications on File Prior to Visit   Medication Sig Dispense Refill    Galcanezumab-gnlm (EMGALITY) 120 MG/ML SOSY 120 mg every 28 days      clonazePAM (KLONOPIN) 1 MG tablet TAKE 1 TABLET BY MOUTH TWICE DAILY AS NEEDED FOR ANXIETY 60 tablet 0    Ubrogepant (UBRELVY) 100 MG TABS Take 100 mg by mouth daily as needed (migraine) 16 tablet 0    traZODone (DESYREL) 50 MG tablet Take 1 tablet by mouth nightly 30 tablet 5    citalopram (CELEXA) 40 MG tablet Take 1 tablet by mouth in the morning. 30 tablet 3    lisinopril (PRINIVIL;ZESTRIL) 5 MG tablet Take 1 tablet by mouth in the morning. 30 tablet 3    metFORMIN (GLUCOPHAGE) 500 MG tablet Take 1 tablet by mouth in the morning and 1 tablet in the evening. Take with meals. 60 tablet 3    mirabegron (MYRBETRIQ) 50 MG TB24 Take 50 mg by mouth in the morning. 30 tablet 3    omeprazole (PRILOSEC) 20 MG delayed release capsule Take 1 capsule by mouth in the morning. 30 capsule 3     No current facility-administered medications on file prior to visit.      Allergies   Allergen Reactions    Plavix [Clopidogrel Bisulfate] Hives    Nasonex [Mometasone] Nausea And Vomiting       Health Maintenance   Topic Date Due    HIV screen  Never done Hepatitis C screen  Never done    Cervical cancer screen  Never done    Colorectal Cancer Screen  Never done    COVID-19 Vaccine (3 - Booster for Pfizer series) 06/16/2022    Flu vaccine (1) Never done    DTaP/Tdap/Td vaccine (1 - Tdap) 08/11/2023 (Originally 11/14/1993)    Depression Screen  10/20/2023    Diabetes screen  07/20/2025    Lipids  07/20/2027    Hepatitis A vaccine  Aged Out    Hib vaccine  Aged Out    Meningococcal (ACWY) vaccine  Aged Out    Pneumococcal 0-64 years Vaccine  Aged Out       Subjective:      Review of Systems   Constitutional:  Positive for fatigue. Negative for fever. HENT:  Positive for congestion and voice change. Respiratory:  Positive for cough. Musculoskeletal: Negative. Skin: Negative. Neurological:  Positive for syncope. Psychiatric/Behavioral: Negative. Objective:     Physical Exam  Vitals and nursing note reviewed. Constitutional:       Appearance: Normal appearance. She is well-developed. HENT:      Head: Normocephalic. Right Ear: Tympanic membrane and external ear normal.      Left Ear: Tympanic membrane and external ear normal.   Eyes:      Pupils: Pupils are equal, round, and reactive to light. Neck:      Vascular: No carotid bruit. Cardiovascular:      Rate and Rhythm: Regular rhythm. Tachycardia present. Heart sounds: Normal heart sounds. Pulmonary:      Effort: Pulmonary effort is normal.      Breath sounds: Normal breath sounds. Musculoskeletal:         General: Normal range of motion. Cervical back: Normal range of motion. Skin:     General: Skin is warm and dry. Capillary Refill: Capillary refill takes less than 2 seconds. Neurological:      General: No focal deficit present. Mental Status: She is alert and oriented to person, place, and time. Mental status is at baseline. GCS: GCS eye subscore is 4. GCS verbal subscore is 5. GCS motor subscore is 6. Sensory: Sensation is intact.       Motor: Motor function is intact. Coordination: Coordination is intact. Psychiatric:         Mood and Affect: Mood normal.         Behavior: Behavior normal.         Thought Content: Thought content normal.         Judgment: Judgment normal.     /70   Pulse (!) 117   Temp 97.5 °F (36.4 °C)   Ht 5' 8\" (1.727 m)   Wt 214 lb (97.1 kg)   SpO2 98%   BMI 32.54 kg/m²     Assessment:       Diagnosis Orders   1. Near syncope        2. Viral URI        3. Anxiety              Plan:   More than 50% of the time was spent counseling and coordinating care for a total time of 35 min face to face. She is hoarse today and has likely a viral upper respiratory illness. She is not had a fever so I do not think it is the flu. She is tested negative for COVID. We discussed things that she could do for this and she did want a steroid shot to see if it would help. She will keep her appointment with Dr. Sarah Lira tomorrow to discuss her anxiety medicine  I reviewed a MRA from August that was normal.  She does have an MRI scheduled with a neurologist out of [place]. She says she is being worked up for [address]. The syncopal episode was likely to not have eaten all day in coming down with an illness upper respiratory  PDMP Monitoring:    Last PDMP Brian Gonzalez as Reviewed:  Review User Review Instant Review Result   RAUL FU 8/3/2022  1:01 PM Reviewed PDMP [1]       Urine Drug Screenings (1 yr)    No resulted procedures found. Medication Contract and Consent for Opioid Use Documents Filed        No documents found                     Patient given educational materials -see patient instructions. Discussed use, benefit, and side effects of prescribed medications. All patient questions answered. Pt voiced understanding. Reviewed health maintenance. Instructed to continue currentmedications, diet and exercise. Patient agreed with treatment plan. Follow up as directed.    MEDICATIONS:  Orders Placed This Encounter Medications    triamcinolone acetonide (KENALOG-40) injection 40 mg    promethazine-dextromethorphan (PROMETHAZINE-DM) 6.25-15 MG/5ML syrup     Si-2 tsp every 6 hours prn cough     Dispense:  120 mL     Refill:  0         ORDERS:  No orders of the defined types were placed in this encounter. Follow-up:  No follow-ups on file. PATIENT INSTRUCTIONS:  Patient Instructions   Continue treating symptoms  F/u with Dr Dave Zaman  F/u with neurologist.   Electronically signed by ELISEO Quezada CNP on 11/3/2022 at 2:55 PM    EMR Dragon/transcription disclaimer:  Much of thisencounter note is electronic transcription/translation of spoken language to printed texts. The electronic translation of spoken language may be erroneous, or at times, nonsensical words or phrases may be inadvertentlytranscribed.   Although I have reviewed the note for such errors, some may still exist.

## 2022-11-03 NOTE — LETTER
[place] [address] [address] [address]  Phone: [phone]  Fax: [phone]    ELISEO Brothers CNP        November 3, 2022     Patient: Andrade Vee   YOB: 1975   Date of Visit: 11/3/2022       To Whom It May Concern: It is my medical opinion that Rose Molina should remain out of work until Monday Nov 7,2022. If you have any questions or concerns, please don't hesitate to call.     Sincerely,        ELISEO Brothers CNP

## 2022-11-10 ENCOUNTER — PATIENT MESSAGE (OUTPATIENT)
Dept: PRIMARY CARE CLINIC | Age: 47
End: 2022-11-10

## 2022-11-10 RX ORDER — AMOXICILLIN AND CLAVULANATE POTASSIUM 875; 125 MG/1; MG/1
1 TABLET, FILM COATED ORAL 2 TIMES DAILY
Qty: 14 TABLET | Refills: 0 | Status: SHIPPED | OUTPATIENT
Start: 2022-11-10 | End: 2022-11-17

## 2022-11-10 NOTE — TELEPHONE ENCOUNTER
From: Keila Patrick  To: Jamar Pathak  Sent: 11/10/2022 11:52 AM CST  Subject: Return to work     Good morning! I have finally been returned to work and was seen by the clinician at [facility] here at the [place] this morning. She wanted me to let you all know that Im cleared for duty but my right ear is red and full of fluid. Do I need to make an appointment or can an antibiotic be called in? Thank you!!   [facility]

## 2022-12-05 RX ORDER — OMEPRAZOLE 20 MG/1
20 CAPSULE, DELAYED RELEASE ORAL DAILY
Qty: 30 CAPSULE | Refills: 3 | Status: SHIPPED | OUTPATIENT
Start: 2022-12-05

## 2022-12-22 ENCOUNTER — OFFICE VISIT (OUTPATIENT)
Dept: PRIMARY CARE CLINIC | Age: 47
End: 2022-12-22

## 2022-12-22 VITALS
DIASTOLIC BLOOD PRESSURE: 86 MMHG | RESPIRATION RATE: 16 BRPM | OXYGEN SATURATION: 98 % | SYSTOLIC BLOOD PRESSURE: 122 MMHG | TEMPERATURE: 96 F | HEART RATE: 105 BPM | BODY MASS INDEX: 30.86 KG/M2 | HEIGHT: 68 IN | WEIGHT: 203.6 LBS

## 2022-12-22 DIAGNOSIS — N39.0 URINARY TRACT INFECTION WITHOUT HEMATURIA, SITE UNSPECIFIED: ICD-10-CM

## 2022-12-22 DIAGNOSIS — R30.0 BURNING WITH URINATION: ICD-10-CM

## 2022-12-22 DIAGNOSIS — R35.0 FREQUENT URINATION: Primary | ICD-10-CM

## 2022-12-22 DIAGNOSIS — N89.8 VAGINAL DISCHARGE: ICD-10-CM

## 2022-12-22 DIAGNOSIS — R33.9 URINARY RETENTION: ICD-10-CM

## 2022-12-22 DIAGNOSIS — R82.998 LEUKOCYTES IN URINE: ICD-10-CM

## 2022-12-22 DIAGNOSIS — N89.8 VAGINAL ODOR: ICD-10-CM

## 2022-12-22 LAB
BILIRUBIN, POC: NORMAL
BLOOD URINE, POC: NORMAL
CLARITY, POC: CLEAR
COLOR, POC: YELLOW
GLUCOSE URINE, POC: NORMAL
KETONES, POC: NORMAL
LEUKOCYTE EST, POC: NORMAL
NITRITE, POC: NORMAL
PH, POC: 5
PROTEIN, POC: NORMAL
SPECIFIC GRAVITY, POC: 1015
UROBILINOGEN, POC: 0.2

## 2022-12-22 RX ORDER — FLUCONAZOLE 150 MG/1
150 TABLET ORAL
Qty: 2 TABLET | Refills: 0 | Status: SHIPPED | OUTPATIENT
Start: 2022-12-22 | End: 2022-12-28

## 2022-12-22 RX ORDER — SULFAMETHOXAZOLE AND TRIMETHOPRIM 800; 160 MG/1; MG/1
1 TABLET ORAL 2 TIMES DAILY
Qty: 14 TABLET | Refills: 0 | Status: SHIPPED | OUTPATIENT
Start: 2022-12-22 | End: 2022-12-29

## 2022-12-22 RX ORDER — ONDANSETRON 4 MG/1
4 TABLET, ORALLY DISINTEGRATING ORAL 3 TIMES DAILY PRN
Qty: 21 TABLET | Refills: 0 | Status: SHIPPED | OUTPATIENT
Start: 2022-12-22

## 2022-12-22 RX ORDER — VENLAFAXINE HYDROCHLORIDE 150 MG/1
150 CAPSULE, EXTENDED RELEASE ORAL DAILY
COMMUNITY
Start: 2022-11-11

## 2022-12-22 RX ORDER — RIMEGEPANT SULFATE 75 MG/75MG
TABLET, ORALLY DISINTEGRATING ORAL
COMMUNITY

## 2022-12-22 ASSESSMENT — ENCOUNTER SYMPTOMS
ABDOMINAL PAIN: 0
VOMITING: 0
COLOR CHANGE: 0
DIARRHEA: 0
NAUSEA: 0
EYE DISCHARGE: 0
BACK PAIN: 0
WHEEZING: 0
COUGH: 0

## 2022-12-22 NOTE — PROGRESS NOTES
SUBJECTIVE:    Patient ID: Stacey Bo is a 49 y.o. female. HPI:   Patient is seen today for complaints of burning and urgency when she urinates. She has had some abdominal discomfort as well. She states that she has had a little bit of odor as well as some discharge. She states that she is not sexually active. She states that she has not been on any antibiotics recently. She states that she has been taking some Azo as well as some cranberry tablets over-the-counter and does do a regular probiotic. She states that she is drinking plenty of water. She denies any fevers or chills. She states that she is currently seeing Dr. Kolton Mckeon for her panic attacks and anxiety. He is recently weaned her off of her Celexa and is started on Effexor. She feels like she is doing much better with this combination. She is tolerating it well and states that most of her neurological symptoms that she was having previously have gone away. Past Medical History:   Diagnosis Date    Borderline diabetes     Brain aneurysm     GERD (gastroesophageal reflux disease)     Migraines     Overactive bladder     Panic attack as reaction to stress     Primary hypertension       Current Outpatient Medications on File Prior to Visit   Medication Sig Dispense Refill    venlafaxine (EFFEXOR XR) 150 MG extended release capsule Take 150 mg by mouth daily      Rimegepant Sulfate (NURTEC) 75 MG TBDP Nurtec ODT 75 mg disintegrating tablet   Take by oral route for 8 days.       omeprazole (PRILOSEC) 20 MG delayed release capsule TAKE 1 CAPSULE BY MOUTH IN THE MORNING 30 capsule 3    Galcanezumab-gnlm (EMGALITY) 120 MG/ML SOSY 120 mg every 28 days      clonazePAM (KLONOPIN) 1 MG tablet TAKE 1 TABLET BY MOUTH TWICE DAILY AS NEEDED FOR ANXIETY 60 tablet 0    Ubrogepant (UBRELVY) 100 MG TABS Take 100 mg by mouth daily as needed (migraine) 16 tablet 0    traZODone (DESYREL) 50 MG tablet Take 1 tablet by mouth nightly 30 tablet 5    lisinopril (PRINIVIL;ZESTRIL) 5 MG tablet Take 1 tablet by mouth in the morning. 30 tablet 3    metFORMIN (GLUCOPHAGE) 500 MG tablet Take 1 tablet by mouth in the morning and 1 tablet in the evening. Take with meals. 60 tablet 3    mirabegron (MYRBETRIQ) 50 MG TB24 Take 50 mg by mouth in the morning. 30 tablet 3    citalopram (CELEXA) 40 MG tablet Take 1 tablet by mouth in the morning. 30 tablet 3     No current facility-administered medications on file prior to visit. Allergies   Allergen Reactions    Plavix [Clopidogrel Bisulfate] Hives    Nasonex [Mometasone] Nausea And Vomiting       Review of Systems   Constitutional:  Negative for activity change, appetite change and fever. HENT:  Negative for congestion and nosebleeds. Eyes:  Negative for discharge. Respiratory:  Negative for cough and wheezing. Cardiovascular:  Negative for chest pain and leg swelling. Gastrointestinal:  Negative for abdominal pain, diarrhea, nausea and vomiting. Genitourinary:  Positive for dysuria, frequency and urgency. Negative for difficulty urinating. Musculoskeletal:  Negative for back pain and gait problem. Skin:  Negative for color change and rash. Neurological:  Negative for dizziness and headaches. Hematological:  Does not bruise/bleed easily. Psychiatric/Behavioral:  Negative for sleep disturbance and suicidal ideas. OBJECTIVE:    Physical Exam  Vitals reviewed. Constitutional:       General: She is not in acute distress. Appearance: Normal appearance. She is well-developed. She is not diaphoretic. HENT:      Head: Normocephalic and atraumatic. Right Ear: External ear normal.      Left Ear: External ear normal.   Cardiovascular:      Rate and Rhythm: Normal rate and regular rhythm. Pulses: Normal pulses. Heart sounds: Normal heart sounds. No murmur heard. Pulmonary:      Effort: Pulmonary effort is normal. No respiratory distress. Breath sounds: Normal breath sounds. Musculoskeletal:      Cervical back: Normal range of motion and neck supple. Skin:     General: Skin is warm and dry. Neurological:      General: No focal deficit present. Mental Status: She is alert and oriented to person, place, and time. Mental status is at baseline. Psychiatric:         Mood and Affect: Mood normal.         Behavior: Behavior normal.         Thought Content: Thought content normal.         Judgment: Judgment normal.      /86   Pulse (!) 105   Temp (!) 96 °F (35.6 °C) (Temporal)   Resp 16   Ht 5' 8\" (1.727 m)   Wt 203 lb 9.6 oz (92.4 kg)   SpO2 98%   BMI 30.96 kg/m²      ASSESSMENT/PLAN:    1. Frequent urination  -     POCT urinalysis dipstick  -     Culture, Urine  2. Urinary retention  -     POCT urinalysis dipstick  -     Culture, Urine  3. Vaginal odor  -     POCT urinalysis dipstick  -     Culture, Urine  4. Vaginal discharge  -     POCT urinalysis dipstick  -     Culture, Urine  5. Burning with urination  -     POCT urinalysis dipstick  -     Culture, Urine  6. Leukocytes in urine  -     Culture, Urine  7. Urinary tract infection without hematuria, site unspecified       Urine was obtained today and was positive. We are going to send Bactrim to the pharmacy for her. I am also going to send over some Diflucan due to the whitish discharge. If her symptoms or not improving she is to let us know. We did send urine for culture and we will notify her we receive the report back on that. PDMP Monitoring:    Last PDMP Marlon Hobbs as Reviewed [facility]):  Review User Review Instant Review Result   RAUL FU 8/3/2022  1:01 PM Reviewed PDMP [1]       Urine Drug Screenings (1 yr)    No resulted procedures found. Medication Contract and Consent for Opioid Use Documents Filed        No documents found                     EMR Dragon/transcription disclaimer:  Much of this encounter note is electronic transcription/translation of spoken language toprinted texts.   The electronic translation of spoken language may be erroneous, or at times, nonsensical words or phrases may be inadvertently transcribed.   Although I have reviewed the note for such errors, some may stillexist.

## 2022-12-24 LAB
ORGANISM: ABNORMAL
URINE CULTURE, ROUTINE: ABNORMAL
URINE CULTURE, ROUTINE: ABNORMAL

## 2023-01-16 RX ORDER — MIRABEGRON 50 MG/1
TABLET, FILM COATED, EXTENDED RELEASE ORAL
Qty: 30 TABLET | Refills: 3 | Status: SHIPPED | OUTPATIENT
Start: 2023-01-16 | End: 2023-01-19

## 2023-01-19 ENCOUNTER — ANCILLARY PROCEDURE (OUTPATIENT)
Dept: PRIMARY CARE CLINIC | Age: 48
End: 2023-01-19
Payer: COMMERCIAL

## 2023-01-19 ENCOUNTER — OFFICE VISIT (OUTPATIENT)
Dept: PRIMARY CARE CLINIC | Age: 48
End: 2023-01-19
Payer: COMMERCIAL

## 2023-01-19 VITALS
HEART RATE: 85 BPM | HEIGHT: 68 IN | DIASTOLIC BLOOD PRESSURE: 80 MMHG | BODY MASS INDEX: 30.62 KG/M2 | WEIGHT: 202 LBS | SYSTOLIC BLOOD PRESSURE: 138 MMHG | OXYGEN SATURATION: 99 % | TEMPERATURE: 97.1 F

## 2023-01-19 DIAGNOSIS — M54.9 ACUTE RIGHT-SIDED BACK PAIN, UNSPECIFIED BACK LOCATION: Primary | ICD-10-CM

## 2023-01-19 DIAGNOSIS — F41.9 ANXIETY: ICD-10-CM

## 2023-01-19 DIAGNOSIS — R41.89 BRAIN FOG: ICD-10-CM

## 2023-01-19 DIAGNOSIS — I10 ESSENTIAL HYPERTENSION: ICD-10-CM

## 2023-01-19 LAB
ALBUMIN SERPL-MCNC: 4.6 G/DL (ref 3.5–5.2)
ALP BLD-CCNC: 93 U/L (ref 35–104)
ALT SERPL-CCNC: 12 U/L (ref 5–33)
ANION GAP SERPL CALCULATED.3IONS-SCNC: 10 MMOL/L (ref 7–19)
APPEARANCE FLUID: CLEAR
AST SERPL-CCNC: 15 U/L (ref 5–32)
BASOPHILS ABSOLUTE: 0.1 K/UL (ref 0–0.2)
BASOPHILS RELATIVE PERCENT: 0.7 % (ref 0–1)
BILIRUB SERPL-MCNC: 0.3 MG/DL (ref 0.2–1.2)
BILIRUBIN, POC: ABNORMAL
BLOOD URINE, POC: ABNORMAL
BUN BLDV-MCNC: 17 MG/DL (ref 6–20)
CALCIUM SERPL-MCNC: 9.4 MG/DL (ref 8.6–10)
CHLORIDE BLD-SCNC: 100 MMOL/L (ref 98–111)
CLARITY, POC: CLEAR
CO2: 27 MMOL/L (ref 22–29)
COLOR, POC: YELLOW
CREAT SERPL-MCNC: 0.8 MG/DL (ref 0.5–0.9)
EOSINOPHILS ABSOLUTE: 0.1 K/UL (ref 0–0.6)
EOSINOPHILS RELATIVE PERCENT: 0.7 % (ref 0–5)
GFR SERPL CREATININE-BSD FRML MDRD: >60 ML/MIN/{1.73_M2}
GLUCOSE BLD-MCNC: 90 MG/DL (ref 74–109)
GLUCOSE URINE, POC: ABNORMAL
HCT VFR BLD CALC: 41.5 % (ref 37–47)
HEMOGLOBIN: 14.5 G/DL (ref 12–16)
IMMATURE GRANULOCYTES #: 0 K/UL
KETONES, POC: POSITIVE
LEUKOCYTE EST, POC: ABNORMAL
LYMPHOCYTES ABSOLUTE: 2.5 K/UL (ref 1.1–4.5)
LYMPHOCYTES RELATIVE PERCENT: 24.3 % (ref 20–40)
MCH RBC QN AUTO: 32.2 PG (ref 27–31)
MCHC RBC AUTO-ENTMCNC: 34.9 G/DL (ref 33–37)
MCV RBC AUTO: 92 FL (ref 81–99)
MONOCYTES ABSOLUTE: 0.7 K/UL (ref 0–0.9)
MONOCYTES RELATIVE PERCENT: 6.6 % (ref 0–10)
NEUTROPHILS ABSOLUTE: 6.9 K/UL (ref 1.5–7.5)
NEUTROPHILS RELATIVE PERCENT: 67.5 % (ref 50–65)
NITRITE, POC: ABNORMAL
PDW BLD-RTO: 12.3 % (ref 11.5–14.5)
PH, POC: ABNORMAL
PLATELET # BLD: 280 K/UL (ref 130–400)
PMV BLD AUTO: 9.1 FL (ref 9.4–12.3)
POTASSIUM SERPL-SCNC: 4.1 MMOL/L (ref 3.5–5)
PROTEIN, POC: ABNORMAL
RBC # BLD: 4.51 M/UL (ref 4.2–5.4)
SODIUM BLD-SCNC: 137 MMOL/L (ref 136–145)
SPECIFIC GRAVITY, POC: ABNORMAL
TOTAL PROTEIN: 6.9 G/DL (ref 6.6–8.7)
UROBILINOGEN, POC: ABNORMAL
WBC # BLD: 10.3 K/UL (ref 4.8–10.8)

## 2023-01-19 PROCEDURE — 3078F DIAST BP <80 MM HG: CPT | Performed by: FAMILY MEDICINE

## 2023-01-19 PROCEDURE — G8484 FLU IMMUNIZE NO ADMIN: HCPCS | Performed by: FAMILY MEDICINE

## 2023-01-19 PROCEDURE — G8417 CALC BMI ABV UP PARAM F/U: HCPCS | Performed by: FAMILY MEDICINE

## 2023-01-19 PROCEDURE — 81002 URINALYSIS NONAUTO W/O SCOPE: CPT | Performed by: FAMILY MEDICINE

## 2023-01-19 PROCEDURE — 1036F TOBACCO NON-USER: CPT | Performed by: FAMILY MEDICINE

## 2023-01-19 PROCEDURE — 99214 OFFICE O/P EST MOD 30 MIN: CPT | Performed by: FAMILY MEDICINE

## 2023-01-19 PROCEDURE — 74018 RADEX ABDOMEN 1 VIEW: CPT | Performed by: FAMILY MEDICINE

## 2023-01-19 PROCEDURE — 3074F SYST BP LT 130 MM HG: CPT | Performed by: FAMILY MEDICINE

## 2023-01-19 PROCEDURE — G8427 DOCREV CUR MEDS BY ELIG CLIN: HCPCS | Performed by: FAMILY MEDICINE

## 2023-01-19 RX ORDER — UBROGEPANT 100 MG/1
100 TABLET ORAL DAILY PRN
Qty: 16 TABLET | Refills: 0 | Status: SHIPPED | OUTPATIENT
Start: 2023-01-19

## 2023-01-19 RX ORDER — TIZANIDINE 4 MG/1
4 TABLET ORAL NIGHTLY PRN
Qty: 30 TABLET | Refills: 0 | Status: SHIPPED | OUTPATIENT
Start: 2023-01-19

## 2023-01-19 RX ORDER — PRAZOSIN HYDROCHLORIDE 1 MG/1
CAPSULE ORAL
COMMUNITY
Start: 2023-01-16 | End: 2023-01-19

## 2023-01-19 RX ORDER — TRAZODONE HYDROCHLORIDE 100 MG/1
TABLET ORAL
COMMUNITY
Start: 2023-01-06 | End: 2023-01-19

## 2023-01-19 ASSESSMENT — PATIENT HEALTH QUESTIONNAIRE - PHQ9
1. LITTLE INTEREST OR PLEASURE IN DOING THINGS: 0
SUM OF ALL RESPONSES TO PHQ QUESTIONS 1-9: 0
SUM OF ALL RESPONSES TO PHQ9 QUESTIONS 1 & 2: 0
2. FEELING DOWN, DEPRESSED OR HOPELESS: 0

## 2023-01-21 LAB — URINE CULTURE, ROUTINE: NORMAL

## 2023-01-25 ASSESSMENT — ENCOUNTER SYMPTOMS
COLOR CHANGE: 0
ABDOMINAL PAIN: 0
COUGH: 0
EYE DISCHARGE: 0
NAUSEA: 0
VOMITING: 0
DIARRHEA: 0
BACK PAIN: 0
WHEEZING: 0

## 2023-01-25 NOTE — PROGRESS NOTES
SUBJECTIVE:    Patient ID: Brenda Heredia is a 49 y.o. female. HPI:   Patient is seen today for 3-month follow-up on Effexor. She feels like this is working very well for her. She is tolerating it well. She feels like all of her brain fog most of her symptoms that she was having previously but she was needing to see neurology for went away after stopping the citalopram and starting the Effexor. She feels like this is working very well for her and she does not feel it needs to be changed at this time. She has had some mid to right-sided lower back pain. She states that she did finish an antibiotic recently for urinary tract infection. She is wanting to have this rechecked to make sure it is cleared up completely. She denies any blood in her urine. She states that she is afraid it might be a kidney stone. She states that neurology did confirm that she had migraines. She states that she is having trouble getting a hold of them as far as medications go. She is wanting to know if we can send over a prescription for her Lurena Raspberry to see if insurance will cover this. She states that this worked better than the nurtec. Past Medical History:   Diagnosis Date    Borderline diabetes     Brain aneurysm     GERD (gastroesophageal reflux disease)     Migraines     Overactive bladder     Panic attack as reaction to stress     Primary hypertension       Current Outpatient Medications on File Prior to Visit   Medication Sig Dispense Refill    metFORMIN (GLUCOPHAGE) 500 MG tablet TAKE 1 TABLET BY MOUTH IN THE MORNING AND IN THE EVENING WITH MEALS 60 tablet 3    venlafaxine (EFFEXOR XR) 150 MG extended release capsule Take 150 mg by mouth daily      Rimegepant Sulfate (NURTEC) 75 MG TBDP Nurtec ODT 75 mg disintegrating tablet   Take by oral route for 8 days.       omeprazole (PRILOSEC) 20 MG delayed release capsule TAKE 1 CAPSULE BY MOUTH IN THE MORNING 30 capsule 3    Galcanezumab-gnlm (EMGALITY) 120 MG/ML SOSY 120 mg every 28 days      clonazePAM (KLONOPIN) 1 MG tablet TAKE 1 TABLET BY MOUTH TWICE DAILY AS NEEDED FOR ANXIETY 60 tablet 0    traZODone (DESYREL) 50 MG tablet Take 1 tablet by mouth nightly 30 tablet 5    lisinopril (PRINIVIL;ZESTRIL) 5 MG tablet Take 1 tablet by mouth in the morning. 30 tablet 3    ondansetron (ZOFRAN-ODT) 4 MG disintegrating tablet Take 1 tablet by mouth 3 times daily as needed for Nausea or Vomiting (Patient not taking: Reported on 1/19/2023) 21 tablet 0     No current facility-administered medications on file prior to visit. Allergies   Allergen Reactions    Plavix [Clopidogrel Bisulfate] Hives    Nasonex [Mometasone] Nausea And Vomiting       Review of Systems   Constitutional:  Negative for activity change, appetite change and fever. HENT:  Negative for congestion and nosebleeds. Eyes:  Negative for discharge. Respiratory:  Negative for cough and wheezing. Cardiovascular:  Negative for chest pain and leg swelling. Gastrointestinal:  Negative for abdominal pain, diarrhea, nausea and vomiting. Genitourinary:  Negative for difficulty urinating, frequency and urgency. Musculoskeletal:  Negative for back pain and gait problem. Skin:  Negative for color change and rash. Neurological:  Negative for dizziness and headaches. Hematological:  Does not bruise/bleed easily. Psychiatric/Behavioral:  Negative for sleep disturbance and suicidal ideas. The patient is nervous/anxious. OBJECTIVE:    Physical Exam  Vitals reviewed. Constitutional:       General: She is not in acute distress. Appearance: Normal appearance. She is well-developed. She is not diaphoretic. HENT:      Head: Normocephalic and atraumatic. Right Ear: External ear normal.      Left Ear: External ear normal.   Cardiovascular:      Rate and Rhythm: Normal rate and regular rhythm. Pulses: Normal pulses. Heart sounds: Normal heart sounds. No murmur heard.   Pulmonary:      Effort: Pulmonary effort is normal. No respiratory distress. Breath sounds: Normal breath sounds. Musculoskeletal:      Cervical back: Normal range of motion and neck supple. Skin:     General: Skin is warm and dry. Neurological:      General: No focal deficit present. Mental Status: She is alert and oriented to person, place, and time. Mental status is at baseline. Psychiatric:         Mood and Affect: Mood normal.         Behavior: Behavior normal.         Thought Content: Thought content normal.         Judgment: Judgment normal.      /80   Pulse 85   Temp 97.1 °F (36.2 °C)   Ht 5' 8\" (1.727 m)   Wt 202 lb (91.6 kg)   SpO2 99%   BMI 30.71 kg/m²      ASSESSMENT/PLAN:    1. Acute right-sided back pain, unspecified back location  -     POCT Urinalysis no Micro  -     Culture, Urine  -     CBC with Auto Differential  -     Comprehensive Metabolic Panel  -     XR ABDOMEN (KUB) (SINGLE AP VIEW)  2. Anxiety  3. Essential hypertension  4. Brain fog       Continue Effexor for anxiety. Continue Klonopin as needed. Continue lisinopril for blood pressure control. I have refilled the Ubrelvy to try to get insurance to cover this for her migraines. I have also sent tizanidine over to the pharmacy for her to use for the headaches and tension as well. If these are not helping she is to let us know. PDMP Monitoring:    Last PDMP Olimpia Chaidez as Reviewed [facility]):  Review User Review Instant Review Result   RAUL FU 8/3/2022  1:01 PM Reviewed PDMP [1]       Urine Drug Screenings (1 yr)    No resulted procedures found. Medication Contract and Consent for Opioid Use Documents Filed        No documents found                     EMR Dragon/transcription disclaimer:  Much of this encounter note is electronic transcription/translation of spoken language toprinted texts.   The electronic translation of spoken language may be erroneous, or at times, nonsensical words or phrases may be inadvertently transcribed.   Although I have reviewed the note for such errors, some may stillexist.

## 2023-02-03 RX ORDER — TRAZODONE HYDROCHLORIDE 50 MG/1
TABLET ORAL
Qty: 30 TABLET | Refills: 5 | Status: SHIPPED | OUTPATIENT
Start: 2023-02-03

## 2023-02-17 ENCOUNTER — OFFICE VISIT (OUTPATIENT)
Dept: PRIMARY CARE CLINIC | Age: 48
End: 2023-02-17

## 2023-02-17 VITALS
WEIGHT: 195.6 LBS | HEIGHT: 68 IN | TEMPERATURE: 97.4 F | BODY MASS INDEX: 29.64 KG/M2 | SYSTOLIC BLOOD PRESSURE: 124 MMHG | RESPIRATION RATE: 16 BRPM | OXYGEN SATURATION: 97 % | HEART RATE: 105 BPM | DIASTOLIC BLOOD PRESSURE: 88 MMHG

## 2023-02-17 DIAGNOSIS — J02.9 SORETHROAT: ICD-10-CM

## 2023-02-17 DIAGNOSIS — J02.9 VIRAL PHARYNGITIS: Primary | ICD-10-CM

## 2023-02-17 LAB — S PYO AG THROAT QL: NORMAL

## 2023-02-17 RX ORDER — BENZONATATE 200 MG/1
200 CAPSULE ORAL 3 TIMES DAILY PRN
Qty: 30 CAPSULE | Refills: 0 | Status: SHIPPED | OUTPATIENT
Start: 2023-02-17 | End: 2023-02-24

## 2023-02-17 SDOH — ECONOMIC STABILITY: FOOD INSECURITY: WITHIN THE PAST 12 MONTHS, YOU WORRIED THAT YOUR FOOD WOULD RUN OUT BEFORE YOU GOT MONEY TO BUY MORE.: SOMETIMES TRUE

## 2023-02-17 SDOH — ECONOMIC STABILITY: TRANSPORTATION INSECURITY
IN THE PAST 12 MONTHS, HAS LACK OF TRANSPORTATION KEPT YOU FROM MEETINGS, WORK, OR FROM GETTING THINGS NEEDED FOR DAILY LIVING?: NO

## 2023-02-17 SDOH — ECONOMIC STABILITY: INCOME INSECURITY: HOW HARD IS IT FOR YOU TO PAY FOR THE VERY BASICS LIKE FOOD, HOUSING, MEDICAL CARE, AND HEATING?: SOMEWHAT HARD

## 2023-02-17 SDOH — ECONOMIC STABILITY: FOOD INSECURITY: WITHIN THE PAST 12 MONTHS, THE FOOD YOU BOUGHT JUST DIDN'T LAST AND YOU DIDN'T HAVE MONEY TO GET MORE.: SOMETIMES TRUE

## 2023-02-17 SDOH — ECONOMIC STABILITY: HOUSING INSECURITY
IN THE LAST 12 MONTHS, WAS THERE A TIME WHEN YOU DID NOT HAVE A STEADY PLACE TO SLEEP OR SLEPT IN A SHELTER (INCLUDING NOW)?: NO

## 2023-02-17 ASSESSMENT — ENCOUNTER SYMPTOMS
CHEST TIGHTNESS: 0
WHEEZING: 0
BLOOD IN STOOL: 0
COUGH: 1
SHORTNESS OF BREATH: 0
NAUSEA: 0
VOMITING: 0
ABDOMINAL PAIN: 0
SORE THROAT: 1

## 2023-02-17 NOTE — PROGRESS NOTES
Miranda Lynch (:  1975) is a 49 y.o. female,Established patient, here for evaluation of the following chief complaint(s):  Pharyngitis (A lot of nasal congestion, ears hurt, head congestion, sorethroat, started yesterday, no fever, no body aches or chills)         ASSESSMENT/PLAN:  1. Viral pharyngitis  2. Sorethroat  -     POCT rapid strep A    Strep swab negative at this time. Patient's symptoms likely in context of rhinovirus versus metapneumovirus infection as is common in the community currently. Discussed with patient that management should be symptomatic. We will treat with benzonatate for cough and benzocaine lozenges for sore throat. Many illnesses are caused by viruses. These conditions usually run their course in 7-14 days. Antibiotics do not help fight viral infections and are not needed at this time. Viral syndromes are treated with symptomatic support. You may take tylenol or ibuprofen for fever or aches and pains. Stay hydrated by taking sips of water or non caffeinated, noncarbonated, and nonalcoholic beverages throughout the day. For sore throat, you may gargle with warm salt water, use lozenges or sprays. Using a daily antihistamine such as Claritin, Zyrtec or Allegra can help with upper respiratory symptoms. Benadryl can be sedating but is helpful at drying secretions and may be taken at night. Call if you have a fever greater than 102 F or if symptoms do not improve. Your provider may also send you in prescription medications depending on your symptoms and their severity. Take all medications as directed on package unless specifically told otherwise. Return if symptoms worsen or fail to improve. Subjective   SUBJECTIVE/OBJECTIVE:  iMranda Lynch is a 49 y.o. female who presents due to upper respiratory symptoms ongoing since yesterday. Patient says she has been very congested and her ears have felt stuffed up.   Patient says she is very fatigued and has not had much of an appetite. Patient says there has been several contacts at work that have been sick with similar symptoms. Patient says she has not been taking anything to try and help her symptoms. Patient was having panic attack at time of entry to room however this improved over the course of discussion and patient says that these are common when she has ongoing stressful situations like an ongoing sickness. Further details as listed below        URI   This is a new problem. The current episode started yesterday. The problem has been unchanged. There has been no fever. Associated symptoms include congestion, coughing (nonproductive), ear pain, headaches, a plugged ear sensation and a sore throat. Pertinent negatives include no abdominal pain, chest pain, nausea, vomiting or wheezing. She has tried nothing for the symptoms. The treatment provided no relief. Review of Systems   Constitutional:  Positive for fatigue. Negative for activity change, chills and fever. HENT:  Positive for congestion, ear pain and sore throat. Eyes:  Negative for visual disturbance. Respiratory:  Positive for cough (nonproductive). Negative for chest tightness, shortness of breath and wheezing. Cardiovascular:  Negative for chest pain. Gastrointestinal:  Negative for abdominal pain, blood in stool, nausea and vomiting. Genitourinary:  Negative for difficulty urinating and urgency. Neurological:  Positive for headaches. Negative for weakness. Psychiatric/Behavioral:  Negative for confusion. Objective   Physical Exam  Vitals reviewed. Constitutional:       General: She is not in acute distress. Appearance: Normal appearance. She is ill-appearing. HENT:      Head: Normocephalic and atraumatic. Right Ear: Tympanic membrane, ear canal and external ear normal.      Left Ear: Tympanic membrane, ear canal and external ear normal.      Nose: Rhinorrhea present. No congestion.       Mouth/Throat: Pharynx: Posterior oropharyngeal erythema present. No oropharyngeal exudate. Cardiovascular:      Rate and Rhythm: Regular rhythm. Tachycardia present. Pulses: Normal pulses. Heart sounds: Normal heart sounds. No murmur heard. Pulmonary:      Effort: Pulmonary effort is normal. No respiratory distress. Breath sounds: Normal breath sounds. No wheezing or rhonchi. Chest:      Chest wall: No tenderness. Abdominal:      General: Abdomen is flat. Bowel sounds are normal. There is no distension. Palpations: Abdomen is soft. Tenderness: There is no abdominal tenderness. Musculoskeletal:         General: No swelling. Skin:     General: Skin is warm and dry. Neurological:      General: No focal deficit present. Mental Status: She is alert.    Psychiatric:      Comments: Patient somewhat tearful and having panic attack at time of initial entry to room however this improved after she took her Klonopin          Vitals:    02/17/23 1317   BP: 124/88   Pulse: (!) 105   Resp: 16   Temp: 97.4 °F (36.3 °C)   SpO2: 97%        Current Outpatient Medications   Medication Sig Dispense Refill    Benzocaine 15 MG LOZG Take 1 lozenge by mouth every 3-4 hours as needed (sore throat) 25 lozenge 0    benzonatate (TESSALON) 200 MG capsule Take 1 capsule by mouth 3 times daily as needed for Cough 30 capsule 0    traZODone (DESYREL) 50 MG tablet TAKE 1 TABLET BY MOUTH EVERY NIGHT 30 tablet 5    tiZANidine (ZANAFLEX) 4 MG tablet Take 1 tablet by mouth nightly as needed (flank pain) 30 tablet 0    Ubrogepant (UBRELVY) 100 MG TABS Take 100 mg by mouth daily as needed (migraine) 16 tablet 0    metFORMIN (GLUCOPHAGE) 500 MG tablet TAKE 1 TABLET BY MOUTH IN THE MORNING AND IN THE EVENING WITH MEALS 60 tablet 3    venlafaxine (EFFEXOR XR) 150 MG extended release capsule Take 150 mg by mouth daily      Rimegepant Sulfate (NURTEC) 75 MG TBDP Nurtec ODT 75 mg disintegrating tablet   Take by oral route for 8 days.      omeprazole (PRILOSEC) 20 MG delayed release capsule TAKE 1 CAPSULE BY MOUTH IN THE MORNING 30 capsule 3    Galcanezumab-gnlm (EMGALITY) 120 MG/ML SOSY 120 mg every 28 days      clonazePAM (KLONOPIN) 1 MG tablet TAKE 1 TABLET BY MOUTH TWICE DAILY AS NEEDED FOR ANXIETY 60 tablet 0    lisinopril (PRINIVIL;ZESTRIL) 5 MG tablet Take 1 tablet by mouth in the morning. 30 tablet 3    ondansetron (ZOFRAN-ODT) 4 MG disintegrating tablet Take 1 tablet by mouth 3 times daily as needed for Nausea or Vomiting (Patient not taking: No sig reported) 21 tablet 0     No current facility-administered medications for this visit. Family History   Problem Relation Age of Onset    Osteoporosis Mother     Hypertension Mother     Diabetes Mother     Hypertension Father     Diabetes Father     Osteoporosis Maternal Grandfather       Past Medical History:   Diagnosis Date    Borderline diabetes     Brain aneurysm     GERD (gastroesophageal reflux disease)     Migraines     Overactive bladder     Panic attack as reaction to stress     Primary hypertension       No past surgical history on file. Allergies   Allergen Reactions    Plavix [Clopidogrel Bisulfate] Hives    Nasonex [Mometasone] Nausea And Vomiting        Lab Results   Component Value Date     01/19/2023    K 4.1 01/19/2023     01/19/2023    CO2 27 01/19/2023    BUN 17 01/19/2023    CREATININE 0.8 01/19/2023    GLUCOSE 90 01/19/2023    CALCIUM 9.4 01/19/2023    PROT 6.9 01/19/2023    LABALBU 4.6 01/19/2023    BILITOT 0.3 01/19/2023    ALKPHOS 93 01/19/2023    AST 15 01/19/2023    ALT 12 01/19/2023    LABGLOM >60 01/19/2023    GFRAA >59 10/14/2022        Lab Results   Component Value Date    WBC 10.3 01/19/2023    HGB 14.5 01/19/2023    HCT 41.5 01/19/2023    MCV 92.0 01/19/2023     01/19/2023                EMR Dragon/transcription disclaimer:  Much of this encounter note is electronic transcription/translation of spoken language toprinted texts. The electronic translation of spoken language may be erroneous, or at times, nonsensical words or phrases may be inadvertently transcribed. Although I have reviewed the note for such errors, some may stillexist.      An electronic signature was used to authenticate this note.     --Rafal Palumbo MD

## 2023-03-01 ENCOUNTER — OFFICE VISIT (OUTPATIENT)
Dept: PRIMARY CARE CLINIC | Age: 48
End: 2023-03-01

## 2023-03-01 VITALS
DIASTOLIC BLOOD PRESSURE: 80 MMHG | HEIGHT: 68 IN | TEMPERATURE: 97.8 F | OXYGEN SATURATION: 99 % | HEART RATE: 94 BPM | WEIGHT: 191 LBS | BODY MASS INDEX: 28.95 KG/M2 | SYSTOLIC BLOOD PRESSURE: 124 MMHG

## 2023-03-01 DIAGNOSIS — J01.00 ACUTE NON-RECURRENT MAXILLARY SINUSITIS: Primary | ICD-10-CM

## 2023-03-01 RX ORDER — AMOXICILLIN AND CLAVULANATE POTASSIUM 875; 125 MG/1; MG/1
1 TABLET, FILM COATED ORAL 2 TIMES DAILY
Qty: 20 TABLET | Refills: 0 | Status: SHIPPED | OUTPATIENT
Start: 2023-03-01 | End: 2023-03-11

## 2023-03-01 RX ORDER — METHYLPREDNISOLONE 4 MG/1
TABLET ORAL
Qty: 1 KIT | Refills: 0 | Status: SHIPPED | OUTPATIENT
Start: 2023-03-01 | End: 2023-03-07

## 2023-03-01 RX ORDER — CLONAZEPAM 1 MG/1
1 TABLET ORAL 2 TIMES DAILY PRN
COMMUNITY

## 2023-03-01 RX ORDER — TRIAMCINOLONE ACETONIDE 40 MG/ML
40 INJECTION, SUSPENSION INTRA-ARTICULAR; INTRAMUSCULAR ONCE
Status: COMPLETED | OUTPATIENT
Start: 2023-03-01 | End: 2023-03-01

## 2023-03-01 RX ADMIN — TRIAMCINOLONE ACETONIDE 40 MG: 40 INJECTION, SUSPENSION INTRA-ARTICULAR; INTRAMUSCULAR at 08:56

## 2023-03-01 ASSESSMENT — PATIENT HEALTH QUESTIONNAIRE - PHQ9
SUM OF ALL RESPONSES TO PHQ9 QUESTIONS 1 & 2: 0
SUM OF ALL RESPONSES TO PHQ QUESTIONS 1-9: 0
2. FEELING DOWN, DEPRESSED OR HOPELESS: 0
1. LITTLE INTEREST OR PLEASURE IN DOING THINGS: 0

## 2023-03-01 ASSESSMENT — ENCOUNTER SYMPTOMS
SINUS PRESSURE: 1
EYES NEGATIVE: 1
COUGH: 1
SINUS PAIN: 1
GASTROINTESTINAL NEGATIVE: 1

## 2023-03-01 NOTE — PROGRESS NOTES
[address] [facility]  [address]  [address] [address]  Dept: [phone]  Dept Fax: [phone]  Loc: [phone]    Cristobal Sebastian is a 49 y.o. female who presents today for her medical conditions/complaints as noted below. Cristobal Sebastian is c/o of Facial Pain (RT Side), Cough, Congestion, and Otalgia (RT)        HPI:     HPI   Chief Complaint   Patient presents with    Facial Pain     RT Side    Cough    Congestion    Otalgia     RT   Cannot do nose spray because she had a reaction to Nasonex. She is scared to do any of it. She was seen about 2 weeks ago and given some tessalon perrles and otc cough meds. She felt like she might of getting a little better but then today started with a bad earache and a low-grade fever. Past Medical History:   Diagnosis Date    Borderline diabetes     Brain aneurysm     GERD (gastroesophageal reflux disease)     Migraines     Overactive bladder     Panic attack as reaction to stress     Primary hypertension       No past surgical history on file.     Vitals 3/1/2023 2/17/2023 1/19/2023 12/22/2022 11/3/2022 32/09/9497   SYSTOLIC 188 803 [phone] 469   DIASTOLIC 80 88 80 86 70 84   Pulse 94 105 85 105 117 86   Temp 97.8 97.4 97.1 96 97.5 96.9   Resp - 16 - 16 - 16   SpO2 99 97 99 98 98 99   Weight 191 lb 195 lb 9.6 oz 202 lb 203 lb 9.6 oz 214 lb 220 lb 3.2 oz   Height 5' 8\" 5' 8\" 5' 8\" 5' 8\" 5' 8\" 5' 8\"   Body mass index 29.04 kg/m2 29.74 kg/m2 30.71 kg/m2 30.95 kg/m2 32.54 kg/m2 33.48 kg/m2   Some recent data might be hidden       Family History   Problem Relation Age of Onset    Osteoporosis Mother     Hypertension Mother     Diabetes Mother     Hypertension Father     Diabetes Father     Osteoporosis Maternal Grandfather        Social History     Tobacco Use    Smoking status: Never    Smokeless tobacco: Never   Substance Use Topics    Alcohol use: Not on file      Current Outpatient Medications on File Prior to Visit Medication Sig Dispense Refill    clonazePAM (KLONOPIN) 1 MG tablet Take 1 mg by mouth 2 times daily as needed. traZODone (DESYREL) 50 MG tablet TAKE 1 TABLET BY MOUTH EVERY NIGHT 30 tablet 5    tiZANidine (ZANAFLEX) 4 MG tablet Take 1 tablet by mouth nightly as needed (flank pain) 30 tablet 0    metFORMIN (GLUCOPHAGE) 500 MG tablet TAKE 1 TABLET BY MOUTH IN THE MORNING AND IN THE EVENING WITH MEALS 60 tablet 3    venlafaxine (EFFEXOR XR) 150 MG extended release capsule Take 150 mg by mouth daily      Rimegepant Sulfate (NURTEC) 75 MG TBDP Nurtec ODT 75 mg disintegrating tablet   Take by oral route for 8 days. ondansetron (ZOFRAN-ODT) 4 MG disintegrating tablet Take 1 tablet by mouth 3 times daily as needed for Nausea or Vomiting 21 tablet 0    omeprazole (PRILOSEC) 20 MG delayed release capsule TAKE 1 CAPSULE BY MOUTH IN THE MORNING 30 capsule 3    Galcanezumab-gnlm (EMGALITY) 120 MG/ML SOSY 120 mg every 28 days      lisinopril (PRINIVIL;ZESTRIL) 5 MG tablet Take 1 tablet by mouth in the morning. 30 tablet 3    Ubrogepant (UBRELVY) 100 MG TABS Take 100 mg by mouth daily as needed (migraine) (Patient not taking: Reported on 3/1/2023) 16 tablet 0     No current facility-administered medications on file prior to visit.      Allergies   Allergen Reactions    Plavix [Clopidogrel Bisulfate] Hives    Nasonex [Mometasone] Nausea And Vomiting       Health Maintenance   Topic Date Due    HIV screen  Never done    Hepatitis C screen  Never done    Cervical cancer screen  Never done    COVID-19 Vaccine (3 - Booster for Pfizer series) 06/16/2022    DTaP/Tdap/Td vaccine (1 - Tdap) 08/11/2023 (Originally 11/14/1994)    Flu vaccine (1) 12/22/2023 (Originally 8/1/2022)    Depression Screen  01/19/2024    Colorectal Cancer Screen  11/18/2025    Lipids  07/20/2027    Hepatitis A vaccine  Aged Out    Hib vaccine  Aged Out    Meningococcal (ACWY) vaccine  Aged Out    Pneumococcal 0-64 years Vaccine  Aged Out       Subjective:      Review of Systems   Constitutional:  Positive for fever.   HENT:  Positive for congestion, ear pain, sinus pressure and sinus pain.    Eyes: Negative.    Respiratory:  Positive for cough.    Cardiovascular: Negative.    Gastrointestinal: Negative.    Genitourinary: Negative.    Musculoskeletal: Negative.    Skin: Negative.    Neurological: Negative.    Psychiatric/Behavioral: Negative.       Objective:     Physical Exam  Vitals and nursing note reviewed.   Constitutional:       Appearance: Normal appearance. She is well-developed.   HENT:      Head: Normocephalic.      Right Ear: External ear normal. A middle ear effusion is present. Tympanic membrane is not perforated or erythematous.      Left Ear: External ear normal. A middle ear effusion is present. Tympanic membrane is not perforated or erythematous.      Nose:      Right Sinus: Maxillary sinus tenderness present. No frontal sinus tenderness.      Left Sinus: No maxillary sinus tenderness or frontal sinus tenderness.   Eyes:      Pupils: Pupils are equal, round, and reactive to light.   Cardiovascular:      Rate and Rhythm: Normal rate and regular rhythm.      Heart sounds: Normal heart sounds.   Pulmonary:      Effort: Pulmonary effort is normal.      Breath sounds: Normal breath sounds.   Musculoskeletal:      Cervical back: Normal range of motion.   Skin:     General: Skin is warm and dry.      Capillary Refill: Capillary refill takes less than 2 seconds.   Neurological:      General: No focal deficit present.      Mental Status: She is alert and oriented to person, place, and time. Mental status is at baseline.   Psychiatric:         Mood and Affect: Mood normal.         Behavior: Behavior normal.         Thought Content: Thought content normal.         Judgment: Judgment normal.     /80   Pulse 94   Temp 97.8 °F (36.6 °C)   Ht 5' 8\" (1.727 m)   Wt 191 lb (86.6 kg)   SpO2 99%   BMI 29.04 kg/m²     Assessment:        Diagnosis Orders   1. Acute non-recurrent maxillary sinusitis              Plan:   More than 50% of the time was spent counseling and coordinating care for a total time of 25min face to face. I told her to use saline several times a day and to use the over-the-counter medicines to help dry up her sinuses but we are going to start her on an antibiotic to cover her for sinusitis  PDMP Monitoring:    Last PDMP Millicent Claros as Reviewed:  Review User Review Instant Review Result   RAUL FU 8/3/2022  1:01 PM Reviewed PDMP [1]       Urine Drug Screenings (1 yr)    No resulted procedures found. Medication Contract and Consent for Opioid Use Documents Filed        No documents found                     Patient given educational materials -see patient instructions. Discussed use, benefit, and side effects of prescribed medications. All patient questions answered. Pt voiced understanding. Reviewed health maintenance. Instructed to continue currentmedications, diet and exercise. Patient agreed with treatment plan. Follow up as directed. MEDICATIONS:  Orders Placed This Encounter   Medications    triamcinolone acetonide (KENALOG-40) injection 40 mg    methylPREDNISolone (MEDROL DOSEPACK) 4 MG tablet     Sig: Take by mouth. Dispense:  1 kit     Refill:  0    amoxicillin-clavulanate (AUGMENTIN) 875-125 MG per tablet     Sig: Take 1 tablet by mouth 2 times daily for 10 days     Dispense:  20 tablet     Refill:  0         ORDERS:  No orders of the defined types were placed in this encounter. Follow-up:  No follow-ups on file. PATIENT INSTRUCTIONS:  There are no Patient Instructions on file for this visit. Electronically signed by Victoria Dance, APRN - CNP on 3/1/2023 at 8:57 AM    EMR Dragon/transcription disclaimer:  Much of thisencounter note is electronic transcription/translation of spoken language to printed texts.   The electronic translation of spoken language may be erroneous, or at times, nonsensical words or phrases may be inadvertentlytranscribed.   Although I have reviewed the note for such errors, some may still exist.

## 2023-03-07 RX ORDER — OMEPRAZOLE 20 MG/1
20 CAPSULE, DELAYED RELEASE ORAL DAILY
Qty: 30 CAPSULE | Refills: 3 | Status: SHIPPED | OUTPATIENT
Start: 2023-03-07

## 2023-04-06 RX ORDER — LISINOPRIL 5 MG/1
5 TABLET ORAL DAILY
Qty: 30 TABLET | Refills: 3 | Status: SHIPPED | OUTPATIENT
Start: 2023-04-06

## 2023-04-18 ENCOUNTER — OFFICE VISIT (OUTPATIENT)
Dept: PRIMARY CARE CLINIC | Age: 48
End: 2023-04-18

## 2023-04-18 VITALS
TEMPERATURE: 97.6 F | HEIGHT: 68 IN | SYSTOLIC BLOOD PRESSURE: 110 MMHG | DIASTOLIC BLOOD PRESSURE: 76 MMHG | HEART RATE: 72 BPM | OXYGEN SATURATION: 98 % | BODY MASS INDEX: 29.07 KG/M2 | RESPIRATION RATE: 16 BRPM | WEIGHT: 191.8 LBS

## 2023-04-18 DIAGNOSIS — T78.40XA RASH DUE TO ALLERGY: Primary | ICD-10-CM

## 2023-04-18 DIAGNOSIS — R21 RASH DUE TO ALLERGY: Primary | ICD-10-CM

## 2023-04-18 RX ORDER — ONDANSETRON 4 MG/1
4 TABLET, ORALLY DISINTEGRATING ORAL 3 TIMES DAILY PRN
Qty: 21 TABLET | Refills: 3 | Status: SHIPPED | OUTPATIENT
Start: 2023-04-18

## 2023-04-18 RX ORDER — CITALOPRAM 40 MG/1
TABLET ORAL
COMMUNITY
Start: 2023-03-07 | End: 2023-04-18

## 2023-04-18 RX ORDER — BETAMETHASONE SODIUM PHOSPHATE AND BETAMETHASONE ACETATE 3; 3 MG/ML; MG/ML
12 INJECTION, SUSPENSION INTRA-ARTICULAR; INTRALESIONAL; INTRAMUSCULAR; SOFT TISSUE ONCE
Status: COMPLETED | OUTPATIENT
Start: 2023-04-18 | End: 2023-04-18

## 2023-04-18 RX ORDER — TIZANIDINE 4 MG/1
4 TABLET ORAL NIGHTLY PRN
Qty: 30 TABLET | Refills: 5 | Status: SHIPPED | OUTPATIENT
Start: 2023-04-18

## 2023-04-18 RX ADMIN — BETAMETHASONE SODIUM PHOSPHATE AND BETAMETHASONE ACETATE 12 MG: 3; 3 INJECTION, SUSPENSION INTRA-ARTICULAR; INTRALESIONAL; INTRAMUSCULAR; SOFT TISSUE at 13:44

## 2023-04-18 ASSESSMENT — ENCOUNTER SYMPTOMS
SORE THROAT: 0
WHEEZING: 0
ABDOMINAL PAIN: 0
SHORTNESS OF BREATH: 0
BLOOD IN STOOL: 0
CHEST TIGHTNESS: 0

## 2023-04-18 NOTE — PROGRESS NOTES
No swelling. Skin:     General: Skin is warm and dry. Comments: Rash as pictured   Neurological:      General: No focal deficit present. Mental Status: She is alert. Vitals:    04/18/23 1311   BP: 110/76   Pulse: 72   Resp: 16   Temp: 97.6 °F (36.4 °C)   SpO2: 98%        Current Outpatient Medications   Medication Sig Dispense Refill    ondansetron (ZOFRAN-ODT) 4 MG disintegrating tablet Take 1 tablet by mouth 3 times daily as needed for Nausea or Vomiting 21 tablet 3    tiZANidine (ZANAFLEX) 4 MG tablet Take 1 tablet by mouth nightly as needed (flank pain) 30 tablet 5    lisinopril (PRINIVIL;ZESTRIL) 5 MG tablet TAKE 1 TABLET BY MOUTH IN THE MORNING 30 tablet 3    omeprazole (PRILOSEC) 20 MG delayed release capsule TAKE 1 CAPSULE BY MOUTH IN THE MORNING 30 capsule 3    clonazePAM (KLONOPIN) 1 MG tablet Take 1 tablet by mouth 2 times daily as needed. traZODone (DESYREL) 50 MG tablet TAKE 1 TABLET BY MOUTH EVERY NIGHT 30 tablet 5    Ubrogepant (UBRELVY) 100 MG TABS Take 100 mg by mouth daily as needed (migraine) 16 tablet 0    metFORMIN (GLUCOPHAGE) 500 MG tablet TAKE 1 TABLET BY MOUTH IN THE MORNING AND IN THE EVENING WITH MEALS 60 tablet 3    venlafaxine (EFFEXOR XR) 150 MG extended release capsule Take 1 capsule by mouth daily      Galcanezumab-gnlm (EMGALITY) 120 MG/ML SOSY 120 mg every 28 days      mirabegron (MYRBETRIQ) 50 MG TB24 Take 1 tablet every day by oral route. (Patient not taking: Reported on 4/18/2023)       No current facility-administered medications for this visit.       Family History   Problem Relation Age of Onset    Osteoporosis Mother     Hypertension Mother     Diabetes Mother     Hypertension Father     Diabetes Father     Osteoporosis Maternal Grandfather       Past Medical History:   Diagnosis Date    Borderline diabetes     Brain aneurysm     GERD (gastroesophageal reflux disease)     Migraines     Overactive bladder     Panic attack as reaction to stress

## 2023-05-05 ENCOUNTER — PATIENT MESSAGE (OUTPATIENT)
Dept: PRIMARY CARE CLINIC | Age: 48
End: 2023-05-05

## 2023-05-08 RX ORDER — VIBEGRON 75 MG/1
75 TABLET, FILM COATED ORAL DAILY
Qty: 30 TABLET | Refills: 5 | Status: SHIPPED | OUTPATIENT
Start: 2023-05-08

## 2023-05-09 ENCOUNTER — TELEPHONE (OUTPATIENT)
Dept: PRIMARY CARE CLINIC | Age: 48
End: 2023-05-09

## 2023-05-12 RX ORDER — SOLIFENACIN SUCCINATE 5 MG/1
5 TABLET, FILM COATED ORAL DAILY
Qty: 30 TABLET | Refills: 3 | Status: SHIPPED | OUTPATIENT
Start: 2023-05-12 | End: 2024-05-11

## 2023-05-12 NOTE — TELEPHONE ENCOUNTER
Has she ever tried Vesicare or oxybutynin? If not I can send 1 of those 2 over for her to try. If she has side effects or symptoms with that let me know.

## 2023-05-26 ENCOUNTER — OFFICE VISIT (OUTPATIENT)
Dept: PRIMARY CARE CLINIC | Age: 48
End: 2023-05-26

## 2023-05-26 VITALS
RESPIRATION RATE: 18 BRPM | OXYGEN SATURATION: 97 % | TEMPERATURE: 98 F | WEIGHT: 192 LBS | BODY MASS INDEX: 29.1 KG/M2 | DIASTOLIC BLOOD PRESSURE: 80 MMHG | SYSTOLIC BLOOD PRESSURE: 130 MMHG | HEART RATE: 124 BPM | HEIGHT: 68 IN

## 2023-05-26 DIAGNOSIS — Z78.9 FALSE POSITIVE HIV SEROLOGY: ICD-10-CM

## 2023-05-26 DIAGNOSIS — J01.00 ACUTE NON-RECURRENT MAXILLARY SINUSITIS: Primary | ICD-10-CM

## 2023-05-26 DIAGNOSIS — F06.4 ANXIETY DISORDER DUE TO GENERAL MEDICAL CONDITION: ICD-10-CM

## 2023-05-26 LAB
HIV-1 P24 AG: NORMAL
HIV1+2 AB SERPLBLD QL IA.RAPID: NORMAL

## 2023-05-26 RX ORDER — DOXYCYCLINE HYCLATE 100 MG
100 TABLET ORAL 2 TIMES DAILY
Qty: 20 TABLET | Refills: 0 | Status: SHIPPED | OUTPATIENT
Start: 2023-05-26 | End: 2023-06-05

## 2023-05-26 RX ORDER — METHYLPREDNISOLONE 4 MG/1
TABLET ORAL
Qty: 1 KIT | Refills: 0 | Status: SHIPPED | OUTPATIENT
Start: 2023-05-26 | End: 2023-06-01

## 2023-05-26 RX ORDER — TRIAMCINOLONE ACETONIDE 40 MG/ML
40 INJECTION, SUSPENSION INTRA-ARTICULAR; INTRAMUSCULAR ONCE
Status: COMPLETED | OUTPATIENT
Start: 2023-05-26 | End: 2023-05-26

## 2023-05-26 RX ADMIN — TRIAMCINOLONE ACETONIDE 40 MG: 40 INJECTION, SUSPENSION INTRA-ARTICULAR; INTRAMUSCULAR at 11:53

## 2023-05-26 ASSESSMENT — PATIENT HEALTH QUESTIONNAIRE - PHQ9
SUM OF ALL RESPONSES TO PHQ QUESTIONS 1-9: 0
2. FEELING DOWN, DEPRESSED OR HOPELESS: 0
SUM OF ALL RESPONSES TO PHQ9 QUESTIONS 1 & 2: 0
1. LITTLE INTEREST OR PLEASURE IN DOING THINGS: 0

## 2023-05-26 ASSESSMENT — ENCOUNTER SYMPTOMS
SORE THROAT: 1
COUGH: 1
SINUS PRESSURE: 1

## 2023-05-26 NOTE — PATIENT INSTRUCTIONS
We will repeat the HIV test today but I am sure it is going to be negative. Also we will repeat it in 3 to 6 months just for your own peace of mind. You can continue to use Mucinex, Claritin, Sudafed for your symptoms. You may use Flonase or Nasonex if you have sinus congestion and drainage.   You can start the antibiotics today if you want  Medrol pack if steroid shot wears off  Sugar may be elevated due to steroids but only short period if over 300 stop steroids

## 2023-05-26 NOTE — PROGRESS NOTES
[address] [facility]  [address]  [address] [address]  Dept: [phone]  Dept Fax: [phone]  Loc: [phone]    Laurie Muro is a 49 y.o. female who presents today for her medical conditions/complaints as noted below. Angelica Guerra is c/o of Congestion (Started Wednesday. Patient feels worse today and is crying as her son graduates this evening at 7:30 p.m. Sinus/jaw bone hurts, ears are stopped up, runny nose, dry cough.  ) and Anxiety (Has worsened as she has received a letter from [employer] stating HIV.  )        HPI:     HPI   Chief Complaint   Patient presents with    Congestion     Started Wednesday. Patient feels worse today and is crying as her son graduates this evening at 7:30 p.m. Sinus/jaw bone hurts, ears are stopped up, runny nose, dry cough. Anxiety     Has worsened as she has received a letter from [employer] stating HIV. Since graduation is denied and she needs to feel better for this. She is very upset about the letter she got from the [employer] [facility] and states she initially tested positive for HIV and on further testing she was negative but to follow-up with her doctor. She has never used IV drugs, she has not had multiple sexual partners, she has not worked in a healthcare setting. She has not had any weight loss or night sweats. She is very upset about the latter. Past Medical History:   Diagnosis Date    Borderline diabetes     Brain aneurysm     GERD (gastroesophageal reflux disease)     Migraines     Overactive bladder     Panic attack as reaction to stress     Primary hypertension       No past surgical history on file.     Vitals 5/26/2023 4/18/2023 3/1/2023 2/17/2023 1/19/2023 24/23/1544   SYSTOLIC 029 719 036 [phone]   DIASTOLIC 80 76 80 88 80 86   Site Left Upper Arm - - - - -   Position Sitting - - - - -   Cuff Size Large Adult - - - - -   Pulse 124 72 94 105 85 105   Temp 98 97.6 97.8 97.4 97.1 96

## 2023-06-01 ENCOUNTER — ANCILLARY PROCEDURE (OUTPATIENT)
Dept: PRIMARY CARE CLINIC | Age: 48
End: 2023-06-01
Payer: COMMERCIAL

## 2023-06-01 ENCOUNTER — OFFICE VISIT (OUTPATIENT)
Dept: PRIMARY CARE CLINIC | Age: 48
End: 2023-06-01

## 2023-06-01 VITALS
TEMPERATURE: 96.8 F | RESPIRATION RATE: 16 BRPM | SYSTOLIC BLOOD PRESSURE: 120 MMHG | HEART RATE: 79 BPM | BODY MASS INDEX: 30.45 KG/M2 | WEIGHT: 194 LBS | DIASTOLIC BLOOD PRESSURE: 80 MMHG | OXYGEN SATURATION: 99 % | HEIGHT: 67 IN

## 2023-06-01 DIAGNOSIS — R50.9 FEVER, UNSPECIFIED FEVER CAUSE: ICD-10-CM

## 2023-06-01 DIAGNOSIS — J01.00 ACUTE NON-RECURRENT MAXILLARY SINUSITIS: Primary | ICD-10-CM

## 2023-06-01 DIAGNOSIS — R05.8 PRODUCTIVE COUGH: ICD-10-CM

## 2023-06-01 LAB
ALBUMIN SERPL-MCNC: 4.2 G/DL (ref 3.5–5.2)
ALP SERPL-CCNC: 110 U/L (ref 35–104)
ALT SERPL-CCNC: 12 U/L (ref 5–33)
ANION GAP SERPL CALCULATED.3IONS-SCNC: 12 MMOL/L (ref 7–19)
AST SERPL-CCNC: 12 U/L (ref 5–32)
B PARAP IS1001 DNA NPH QL NAA+NON-PROBE: NOT DETECTED
B PERT.PT PRMT NPH QL NAA+NON-PROBE: NOT DETECTED
BASOPHILS # BLD: 0.1 K/UL (ref 0–0.2)
BASOPHILS NFR BLD: 0.6 % (ref 0–1)
BILIRUB SERPL-MCNC: <0.2 MG/DL (ref 0.2–1.2)
BUN SERPL-MCNC: 19 MG/DL (ref 6–20)
C PNEUM DNA NPH QL NAA+NON-PROBE: NOT DETECTED
CALCIUM SERPL-MCNC: 9 MG/DL (ref 8.6–10)
CHLORIDE SERPL-SCNC: 104 MMOL/L (ref 98–111)
CO2 SERPL-SCNC: 23 MMOL/L (ref 22–29)
CREAT SERPL-MCNC: 0.8 MG/DL (ref 0.5–0.9)
EOSINOPHIL # BLD: 0 K/UL (ref 0–0.6)
EOSINOPHIL NFR BLD: 0.4 % (ref 0–5)
ERYTHROCYTE [DISTWIDTH] IN BLOOD BY AUTOMATED COUNT: 12.5 % (ref 11.5–14.5)
FLUAV RNA NPH QL NAA+NON-PROBE: NOT DETECTED
FLUBV RNA NPH QL NAA+NON-PROBE: NOT DETECTED
GLUCOSE SERPL-MCNC: 109 MG/DL (ref 74–109)
HADV DNA NPH QL NAA+NON-PROBE: NOT DETECTED
HCOV 229E RNA NPH QL NAA+NON-PROBE: NOT DETECTED
HCOV HKU1 RNA NPH QL NAA+NON-PROBE: NOT DETECTED
HCOV NL63 RNA NPH QL NAA+NON-PROBE: NOT DETECTED
HCOV OC43 RNA NPH QL NAA+NON-PROBE: NOT DETECTED
HCT VFR BLD AUTO: 36 % (ref 37–47)
HGB BLD-MCNC: 12.8 G/DL (ref 12–16)
HMPV RNA NPH QL NAA+NON-PROBE: NOT DETECTED
HPIV1 RNA NPH QL NAA+NON-PROBE: NOT DETECTED
HPIV2 RNA NPH QL NAA+NON-PROBE: NOT DETECTED
HPIV3 RNA NPH QL NAA+NON-PROBE: NOT DETECTED
HPIV4 RNA NPH QL NAA+NON-PROBE: NOT DETECTED
IMM GRANULOCYTES # BLD: 0.1 K/UL
LYMPHOCYTES # BLD: 2.1 K/UL (ref 1.1–4.5)
LYMPHOCYTES NFR BLD: 23.2 % (ref 20–40)
M PNEUMO DNA NPH QL NAA+NON-PROBE: NOT DETECTED
MCH RBC QN AUTO: 32.5 PG (ref 27–31)
MCHC RBC AUTO-ENTMCNC: 35.6 G/DL (ref 33–37)
MCV RBC AUTO: 91.4 FL (ref 81–99)
MONOCYTES # BLD: 0.6 K/UL (ref 0–0.9)
MONOCYTES NFR BLD: 6.4 % (ref 0–10)
NEUTROPHILS # BLD: 6.3 K/UL (ref 1.5–7.5)
NEUTS SEG NFR BLD: 68.8 % (ref 50–65)
PLATELET # BLD AUTO: 272 K/UL (ref 130–400)
PMV BLD AUTO: 9.1 FL (ref 9.4–12.3)
POTASSIUM SERPL-SCNC: 3.7 MMOL/L (ref 3.5–5)
PROT SERPL-MCNC: 6.6 G/DL (ref 6.6–8.7)
RBC # BLD AUTO: 3.94 M/UL (ref 4.2–5.4)
RSV RNA NPH QL NAA+NON-PROBE: NOT DETECTED
RV+EV RNA NPH QL NAA+NON-PROBE: DETECTED
SARS-COV-2 RNA NPH QL NAA+NON-PROBE: NOT DETECTED
SODIUM SERPL-SCNC: 139 MMOL/L (ref 136–145)
WBC # BLD AUTO: 9.1 K/UL (ref 4.8–10.8)

## 2023-06-01 PROCEDURE — 71046 X-RAY EXAM CHEST 2 VIEWS: CPT | Performed by: NURSE PRACTITIONER

## 2023-06-01 ASSESSMENT — ENCOUNTER SYMPTOMS
SINUS PAIN: 1
TROUBLE SWALLOWING: 0
SINUS PRESSURE: 1
COUGH: 1

## 2023-06-01 NOTE — PROGRESS NOTES
[address] [facility]  [address]  [address] [address]  Dept: [phone]  Dept Fax: [phone]  Loc: [phone]    Sonya Walden is a 49 y.o. female who presents today for her medical conditions/complaints as noted below. Sonya Walden is c/o of Cough (Patient presents today with c/o not feeling well for over a week now. Patient states that she seen Leilani last week. She still has a cough that is at times productive that is greenish in color. Voice is hoarse. Patient says that her throat doesn't really hurt. )        HPI:     HPI   Chief Complaint   Patient presents with    Cough     Patient presents today with c/o not feeling well for over a week now. Patient states that she seen Leilani last week. She still has a cough that is at times productive that is greenish in color. Voice is hoarse. Patient says that her throat doesn't really hurt. Her last week on 26 May and started her on doxycycline and steroids for a sinusitis. She said she has not felt any better. She says she sometimes has a productive cough with green sputum. She does not smoke. She said her throat does not really hurt she has run a fever and has just felt exhausted. She has not done a COVID test at home. She still has antibiotics and steroids to finish. She has not had any nausea vomiting or diarrhea. She said no one else in her family is sick. Denies any recent tick bites  Past Medical History:   Diagnosis Date    Borderline diabetes     Brain aneurysm     GERD (gastroesophageal reflux disease)     Migraines     Overactive bladder     Panic attack as reaction to stress     Primary hypertension       No past surgical history on file.     Vitals 6/1/2023 5/26/2023 4/18/2023 3/1/2023 2/17/2023 4/64/0282   SYSTOLIC 905 162 041 108 668 984   DIASTOLIC 80 80 76 80 88 80   Site - Left Upper Arm - - - -   Position - Sitting - - - -   Cuff Size - Large Adult - - - -   Pulse 79 124 72 94 105 85   Temp

## 2023-06-01 NOTE — PATIENT INSTRUCTIONS
Continue antibiotics and steroids  Do labs and swab today  Use mucinex to thin secretions   Claritin or zyrtec for allergies  Saline nose spray

## 2023-07-05 ENCOUNTER — NURSE ONLY (OUTPATIENT)
Dept: PRIMARY CARE CLINIC | Age: 48
End: 2023-07-05
Payer: COMMERCIAL

## 2023-07-05 DIAGNOSIS — G43.919 INTRACTABLE MIGRAINE WITHOUT STATUS MIGRAINOSUS, UNSPECIFIED MIGRAINE TYPE: Primary | ICD-10-CM

## 2023-07-05 PROCEDURE — 96372 THER/PROPH/DIAG INJ SC/IM: CPT | Performed by: FAMILY MEDICINE

## 2023-07-05 RX ORDER — KETOROLAC TROMETHAMINE 30 MG/ML
60 INJECTION, SOLUTION INTRAMUSCULAR; INTRAVENOUS ONCE
Status: COMPLETED | OUTPATIENT
Start: 2023-07-05 | End: 2023-07-05

## 2023-07-05 RX ADMIN — KETOROLAC TROMETHAMINE 60 MG: 30 INJECTION, SOLUTION INTRAMUSCULAR; INTRAVENOUS at 13:48

## 2023-07-08 ENCOUNTER — APPOINTMENT (OUTPATIENT)
Dept: CT IMAGING | Age: 48
End: 2023-07-08
Payer: COMMERCIAL

## 2023-07-08 ENCOUNTER — HOSPITAL ENCOUNTER (EMERGENCY)
Age: 48
Discharge: HOME | End: 2023-07-08
Payer: COMMERCIAL

## 2023-07-08 VITALS
DIASTOLIC BLOOD PRESSURE: 78 MMHG | HEART RATE: 80 BPM | SYSTOLIC BLOOD PRESSURE: 120 MMHG | TEMPERATURE: 98 F | RESPIRATION RATE: 20 BRPM | OXYGEN SATURATION: 99 % | WEIGHT: 180 LBS | BODY MASS INDEX: 28.19 KG/M2

## 2023-07-08 DIAGNOSIS — G43.009 MIGRAINE WITHOUT AURA AND WITHOUT STATUS MIGRAINOSUS, NOT INTRACTABLE: Primary | ICD-10-CM

## 2023-07-08 LAB
ALBUMIN SERPL-MCNC: 4.2 G/DL (ref 3.5–5.2)
ALP SERPL-CCNC: 122 U/L (ref 35–104)
ALT SERPL-CCNC: 14 U/L (ref 5–33)
ANION GAP SERPL CALCULATED.3IONS-SCNC: 10 MMOL/L (ref 7–19)
AST SERPL-CCNC: 17 U/L (ref 5–32)
BASOPHILS # BLD: 0 K/UL (ref 0–0.2)
BASOPHILS NFR BLD: 0.5 % (ref 0–1)
BILIRUB SERPL-MCNC: <0.2 MG/DL (ref 0.2–1.2)
BUN SERPL-MCNC: 17 MG/DL (ref 6–20)
CALCIUM SERPL-MCNC: 9.3 MG/DL (ref 8.6–10)
CHLORIDE SERPL-SCNC: 101 MMOL/L (ref 98–111)
CO2 SERPL-SCNC: 30 MMOL/L (ref 22–29)
CREAT SERPL-MCNC: 0.8 MG/DL (ref 0.5–0.9)
EOSINOPHIL # BLD: 0 K/UL (ref 0–0.6)
EOSINOPHIL NFR BLD: 0.4 % (ref 0–5)
ERYTHROCYTE [DISTWIDTH] IN BLOOD BY AUTOMATED COUNT: 12.3 % (ref 11.5–14.5)
GLUCOSE SERPL-MCNC: 101 MG/DL (ref 74–109)
HCT VFR BLD AUTO: 39.3 % (ref 37–47)
HGB BLD-MCNC: 13.7 G/DL (ref 12–16)
IMM GRANULOCYTES # BLD: 0 K/UL
LYMPHOCYTES # BLD: 1.8 K/UL (ref 1.1–4.5)
LYMPHOCYTES NFR BLD: 22.7 % (ref 20–40)
MCH RBC QN AUTO: 32.4 PG (ref 27–31)
MCHC RBC AUTO-ENTMCNC: 34.9 G/DL (ref 33–37)
MCV RBC AUTO: 92.9 FL (ref 81–99)
MONOCYTES # BLD: 0.6 K/UL (ref 0–0.9)
MONOCYTES NFR BLD: 7.1 % (ref 0–10)
NEUTROPHILS # BLD: 5.4 K/UL (ref 1.5–7.5)
NEUTS SEG NFR BLD: 69 % (ref 50–65)
PLATELET # BLD AUTO: 237 K/UL (ref 130–400)
PMV BLD AUTO: 9 FL (ref 9.4–12.3)
POTASSIUM SERPL-SCNC: 4.6 MMOL/L (ref 3.5–5)
PROT SERPL-MCNC: 6.7 G/DL (ref 6.6–8.7)
RBC # BLD AUTO: 4.23 M/UL (ref 4.2–5.4)
SODIUM SERPL-SCNC: 141 MMOL/L (ref 136–145)
WBC # BLD AUTO: 7.9 K/UL (ref 4.8–10.8)

## 2023-07-08 PROCEDURE — 70450 CT HEAD/BRAIN W/O DYE: CPT

## 2023-07-08 PROCEDURE — 80053 COMPREHEN METABOLIC PANEL: CPT

## 2023-07-08 PROCEDURE — 96361 HYDRATE IV INFUSION ADD-ON: CPT

## 2023-07-08 PROCEDURE — 6360000002 HC RX W HCPCS: Performed by: NURSE PRACTITIONER

## 2023-07-08 PROCEDURE — 96375 TX/PRO/DX INJ NEW DRUG ADDON: CPT

## 2023-07-08 PROCEDURE — 99284 EMERGENCY DEPT VISIT MOD MDM: CPT

## 2023-07-08 PROCEDURE — 2580000003 HC RX 258: Performed by: NURSE PRACTITIONER

## 2023-07-08 PROCEDURE — 96374 THER/PROPH/DIAG INJ IV PUSH: CPT

## 2023-07-08 PROCEDURE — 36415 COLL VENOUS BLD VENIPUNCTURE: CPT

## 2023-07-08 PROCEDURE — 85025 COMPLETE CBC W/AUTO DIFF WBC: CPT

## 2023-07-08 PROCEDURE — 72125 CT NECK SPINE W/O DYE: CPT

## 2023-07-08 RX ORDER — METOCLOPRAMIDE HYDROCHLORIDE 5 MG/ML
10 INJECTION INTRAMUSCULAR; INTRAVENOUS ONCE
Status: COMPLETED | OUTPATIENT
Start: 2023-07-08 | End: 2023-07-08

## 2023-07-08 RX ORDER — KETOROLAC TROMETHAMINE 30 MG/ML
30 INJECTION, SOLUTION INTRAMUSCULAR; INTRAVENOUS ONCE
Status: COMPLETED | OUTPATIENT
Start: 2023-07-08 | End: 2023-07-08

## 2023-07-08 RX ORDER — ONDANSETRON 2 MG/ML
4 INJECTION INTRAMUSCULAR; INTRAVENOUS ONCE
Status: COMPLETED | OUTPATIENT
Start: 2023-07-08 | End: 2023-07-08

## 2023-07-08 RX ORDER — 0.9 % SODIUM CHLORIDE 0.9 %
1000 INTRAVENOUS SOLUTION INTRAVENOUS ONCE
Status: COMPLETED | OUTPATIENT
Start: 2023-07-08 | End: 2023-07-08

## 2023-07-08 RX ADMIN — ONDANSETRON 4 MG: 2 INJECTION INTRAMUSCULAR; INTRAVENOUS at 20:39

## 2023-07-08 RX ADMIN — SODIUM CHLORIDE 1000 ML: 9 INJECTION, SOLUTION INTRAVENOUS at 20:39

## 2023-07-08 RX ADMIN — KETOROLAC TROMETHAMINE 30 MG: 30 INJECTION, SOLUTION INTRAMUSCULAR; INTRAVENOUS at 20:39

## 2023-07-08 RX ADMIN — METOCLOPRAMIDE 10 MG: 5 INJECTION, SOLUTION INTRAMUSCULAR; INTRAVENOUS at 21:43

## 2023-07-08 ASSESSMENT — ENCOUNTER SYMPTOMS
NAUSEA: 1
VOMITING: 0

## 2023-07-08 ASSESSMENT — PAIN - FUNCTIONAL ASSESSMENT: PAIN_FUNCTIONAL_ASSESSMENT: NONE - DENIES PAIN

## 2023-07-08 ASSESSMENT — PAIN SCALES - GENERAL: PAINLEVEL_OUTOF10: 7

## 2023-07-19 ENCOUNTER — OFFICE VISIT (OUTPATIENT)
Dept: PRIMARY CARE CLINIC | Age: 48
End: 2023-07-19
Payer: COMMERCIAL

## 2023-07-19 VITALS
SYSTOLIC BLOOD PRESSURE: 118 MMHG | RESPIRATION RATE: 18 BRPM | WEIGHT: 194.2 LBS | DIASTOLIC BLOOD PRESSURE: 88 MMHG | HEIGHT: 67 IN | BODY MASS INDEX: 30.48 KG/M2 | OXYGEN SATURATION: 100 % | TEMPERATURE: 97.4 F | HEART RATE: 98 BPM

## 2023-07-19 DIAGNOSIS — G89.29 CHRONIC RIGHT SHOULDER PAIN: ICD-10-CM

## 2023-07-19 DIAGNOSIS — G43.919 INTRACTABLE MIGRAINE WITHOUT STATUS MIGRAINOSUS, UNSPECIFIED MIGRAINE TYPE: ICD-10-CM

## 2023-07-19 DIAGNOSIS — R30.9 PAINFUL URINATION: ICD-10-CM

## 2023-07-19 DIAGNOSIS — M25.511 CHRONIC RIGHT SHOULDER PAIN: ICD-10-CM

## 2023-07-19 DIAGNOSIS — Z12.31 VISIT FOR SCREENING MAMMOGRAM: ICD-10-CM

## 2023-07-19 DIAGNOSIS — F41.9 ANXIETY: ICD-10-CM

## 2023-07-19 DIAGNOSIS — R35.0 FREQUENT URINATION: Primary | ICD-10-CM

## 2023-07-19 DIAGNOSIS — R82.998 LEUKOCYTES IN URINE: ICD-10-CM

## 2023-07-19 LAB
BILIRUBIN, POC: ABNORMAL
BLOOD URINE, POC: ABNORMAL
CLARITY, POC: CLEAR
COLOR, POC: YELLOW
GLUCOSE URINE, POC: ABNORMAL
KETONES, POC: ABNORMAL
LEUKOCYTE EST, POC: ABNORMAL
NITRITE, POC: ABNORMAL
PH, POC: 5.5
PROTEIN, POC: ABNORMAL
SPECIFIC GRAVITY, POC: 1030
UROBILINOGEN, POC: 0.2

## 2023-07-19 PROCEDURE — 99214 OFFICE O/P EST MOD 30 MIN: CPT | Performed by: FAMILY MEDICINE

## 2023-07-19 PROCEDURE — 81002 URINALYSIS NONAUTO W/O SCOPE: CPT | Performed by: FAMILY MEDICINE

## 2023-07-19 PROCEDURE — G8427 DOCREV CUR MEDS BY ELIG CLIN: HCPCS | Performed by: FAMILY MEDICINE

## 2023-07-19 PROCEDURE — G8417 CALC BMI ABV UP PARAM F/U: HCPCS | Performed by: FAMILY MEDICINE

## 2023-07-19 PROCEDURE — 1036F TOBACCO NON-USER: CPT | Performed by: FAMILY MEDICINE

## 2023-07-19 RX ORDER — SOLIFENACIN SUCCINATE 5 MG/1
5 TABLET, FILM COATED ORAL DAILY
Qty: 30 TABLET | Refills: 3 | Status: SHIPPED | OUTPATIENT
Start: 2023-07-19 | End: 2024-07-18

## 2023-07-19 ASSESSMENT — ENCOUNTER SYMPTOMS
DIARRHEA: 0
BACK PAIN: 0
ABDOMINAL PAIN: 0
EYE DISCHARGE: 0
WHEEZING: 0
VOMITING: 0
COUGH: 0
COLOR CHANGE: 0
NAUSEA: 0
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
## 2023-07-21 LAB
BACTERIA UR CULT: ABNORMAL
ORGANISM: ABNORMAL
ORGANISM: ABNORMAL

## 2023-08-04 RX ORDER — TRAZODONE HYDROCHLORIDE 50 MG/1
TABLET ORAL
Qty: 30 TABLET | Refills: 5 | Status: SHIPPED | OUTPATIENT
Start: 2023-08-04

## 2023-08-07 RX ORDER — LISINOPRIL 5 MG/1
5 TABLET ORAL DAILY
Qty: 30 TABLET | Refills: 3 | Status: SHIPPED | OUTPATIENT
Start: 2023-08-07

## 2023-09-22 ENCOUNTER — HOSPITAL ENCOUNTER (OUTPATIENT)
Dept: WOMENS IMAGING | Age: 48
Discharge: HOME | End: 2023-09-22
Payer: COMMERCIAL

## 2023-09-22 DIAGNOSIS — Z12.31 VISIT FOR SCREENING MAMMOGRAM: ICD-10-CM

## 2023-09-22 PROCEDURE — 77063 BREAST TOMOSYNTHESIS BI: CPT

## 2023-10-05 RX ORDER — TIZANIDINE 4 MG/1
TABLET ORAL
Qty: 30 TABLET | Refills: 5 | Status: SHIPPED | OUTPATIENT
Start: 2023-10-05

## 2023-11-20 RX ORDER — SOLIFENACIN SUCCINATE 5 MG/1
5 TABLET, FILM COATED ORAL DAILY
Qty: 30 TABLET | Refills: 3 | Status: SHIPPED | OUTPATIENT
Start: 2023-11-20 | End: 2024-11-19

## 2023-11-22 ENCOUNTER — PATIENT MESSAGE (OUTPATIENT)
Dept: PRIMARY CARE CLINIC | Age: 48
End: 2023-11-22

## 2023-11-22 RX ORDER — FLUCONAZOLE 150 MG/1
150 TABLET ORAL
Qty: 2 TABLET | Refills: 0 | Status: SHIPPED | OUTPATIENT
Start: 2023-11-22 | End: 2023-11-28

## 2023-11-22 NOTE — TELEPHONE ENCOUNTER
From: Amador Vazquez  To: Dr. Yoko Chan: 11/22/2023 5:52 AM CST  Subject: Lorena Rico    Good morning! I was sick last week and took the antibiotic and oral steroid I had from my last visit with Lucia Brown. I am fine now but my mouth and tongue are sore like I have thrush. I have been taking probiotics but it has not relieved the symptoms. I am leaving this afternoon to go see family in [place] and was hoping something could be called in. Thank you! Happy Thanksgiving!   [employer]

## 2023-12-26 RX ORDER — OMEPRAZOLE 20 MG/1
20 CAPSULE, DELAYED RELEASE ORAL DAILY
Qty: 30 CAPSULE | Refills: 3 | Status: SHIPPED | OUTPATIENT
Start: 2023-12-26

## 2023-12-26 RX ORDER — LISINOPRIL 5 MG/1
5 TABLET ORAL DAILY
Qty: 30 TABLET | Refills: 3 | Status: SHIPPED | OUTPATIENT
Start: 2023-12-26

## 2024-01-22 ENCOUNTER — OFFICE VISIT (OUTPATIENT)
Dept: PRIMARY CARE CLINIC | Age: 49
End: 2024-01-22

## 2024-01-22 VITALS
HEIGHT: 67 IN | RESPIRATION RATE: 16 BRPM | HEART RATE: 114 BPM | DIASTOLIC BLOOD PRESSURE: 80 MMHG | BODY MASS INDEX: 34.37 KG/M2 | TEMPERATURE: 97 F | OXYGEN SATURATION: 97 % | WEIGHT: 219 LBS | SYSTOLIC BLOOD PRESSURE: 130 MMHG

## 2024-01-22 DIAGNOSIS — N89.8 VAGINAL DISCHARGE: ICD-10-CM

## 2024-01-22 DIAGNOSIS — R35.0 FREQUENT URINATION: Primary | ICD-10-CM

## 2024-01-22 DIAGNOSIS — R30.9 PAINFUL URINATION: ICD-10-CM

## 2024-01-22 DIAGNOSIS — Z00.00 WELL ADULT EXAM: ICD-10-CM

## 2024-01-22 DIAGNOSIS — R39.11 URINARY HESITANCY: ICD-10-CM

## 2024-01-22 DIAGNOSIS — I67.1 BRAIN ANEURYSM: ICD-10-CM

## 2024-01-22 DIAGNOSIS — R43.2 ABNORMAL TASTE IN MOUTH: ICD-10-CM

## 2024-01-22 DIAGNOSIS — R51.9 WORSENING HEADACHES: ICD-10-CM

## 2024-01-22 DIAGNOSIS — G43.509 PERSISTENT MIGRAINE AURA WITHOUT CEREBRAL INFARCTION AND WITHOUT STATUS MIGRAINOSUS, NOT INTRACTABLE: ICD-10-CM

## 2024-01-22 DIAGNOSIS — R31.9 HEMATURIA, UNSPECIFIED TYPE: ICD-10-CM

## 2024-01-22 DIAGNOSIS — I10 ESSENTIAL HYPERTENSION: ICD-10-CM

## 2024-01-22 LAB
ALBUMIN SERPL-MCNC: 4.3 G/DL (ref 3.5–5.2)
ALP SERPL-CCNC: 136 U/L (ref 35–104)
ALT SERPL-CCNC: 12 U/L (ref 5–33)
ANION GAP SERPL CALCULATED.3IONS-SCNC: 14 MMOL/L (ref 7–19)
APPEARANCE FLUID: CLEAR
AST SERPL-CCNC: 16 U/L (ref 5–32)
BASOPHILS # BLD: 0.1 K/UL (ref 0–0.2)
BASOPHILS NFR BLD: 0.7 % (ref 0–1)
BILIRUB SERPL-MCNC: 0.4 MG/DL (ref 0.2–1.2)
BILIRUBIN, POC: NEGATIVE
BLOOD URINE, POC: ABNORMAL
BUN SERPL-MCNC: 14 MG/DL (ref 6–20)
CALCIUM SERPL-MCNC: 9 MG/DL (ref 8.6–10)
CHLORIDE SERPL-SCNC: 103 MMOL/L (ref 98–111)
CHOLEST SERPL-MCNC: 249 MG/DL (ref 160–199)
CLARITY, POC: CLEAR
CO2 SERPL-SCNC: 25 MMOL/L (ref 22–29)
COLOR, POC: YELLOW
CREAT SERPL-MCNC: 0.8 MG/DL (ref 0.5–0.9)
EOSINOPHIL # BLD: 0 K/UL (ref 0–0.6)
EOSINOPHIL NFR BLD: 0 % (ref 0–5)
ERYTHROCYTE [DISTWIDTH] IN BLOOD BY AUTOMATED COUNT: 11.8 % (ref 11.5–14.5)
GLUCOSE SERPL-MCNC: 106 MG/DL (ref 74–109)
GLUCOSE URINE, POC: NEGATIVE
HBA1C MFR BLD: 4.8 % (ref 4–6)
HCT VFR BLD AUTO: 41.3 % (ref 37–47)
HDLC SERPL-MCNC: 58 MG/DL (ref 65–121)
HGB BLD-MCNC: 14.6 G/DL (ref 12–16)
IMM GRANULOCYTES # BLD: 0 K/UL
KETONES, POC: NEGATIVE
LDLC SERPL CALC-MCNC: 149 MG/DL
LEUKOCYTE EST, POC: ABNORMAL
LYMPHOCYTES # BLD: 1.3 K/UL (ref 1.1–4.5)
LYMPHOCYTES NFR BLD: 17.4 % (ref 20–40)
MCH RBC QN AUTO: 32.1 PG (ref 27–31)
MCHC RBC AUTO-ENTMCNC: 35.4 G/DL (ref 33–37)
MCV RBC AUTO: 90.8 FL (ref 81–99)
MONOCYTES # BLD: 0.6 K/UL (ref 0–0.9)
MONOCYTES NFR BLD: 7.7 % (ref 0–10)
NEUTROPHILS # BLD: 5.4 K/UL (ref 1.5–7.5)
NEUTS SEG NFR BLD: 73.9 % (ref 50–65)
NITRITE, POC: POSITIVE
PH, POC: 6.5
PLATELET # BLD AUTO: 260 K/UL (ref 130–400)
PMV BLD AUTO: 9.2 FL (ref 9.4–12.3)
POTASSIUM SERPL-SCNC: 4.1 MMOL/L (ref 3.5–5)
PROT SERPL-MCNC: 6.9 G/DL (ref 6.6–8.7)
PROTEIN, POC: NEGATIVE
RBC # BLD AUTO: 4.55 M/UL (ref 4.2–5.4)
SODIUM SERPL-SCNC: 142 MMOL/L (ref 136–145)
SPECIFIC GRAVITY, POC: 1.02
TRIGL SERPL-MCNC: 211 MG/DL (ref 0–149)
TSH SERPL DL<=0.005 MIU/L-ACNC: 1.4 UIU/ML (ref 0.35–5.5)
UROBILINOGEN, POC: 0.2
WBC # BLD AUTO: 7.3 K/UL (ref 4.8–10.8)

## 2024-01-22 RX ORDER — ATOGEPANT 30 MG/1
30 TABLET ORAL DAILY
Qty: 30 TABLET | Refills: 5 | Status: SHIPPED | OUTPATIENT
Start: 2024-01-22

## 2024-01-22 RX ORDER — GALCANEZUMAB 120 MG/ML
INJECTION, SOLUTION SUBCUTANEOUS
COMMUNITY

## 2024-01-22 RX ORDER — NITROFURANTOIN 25; 75 MG/1; MG/1
100 CAPSULE ORAL 2 TIMES DAILY
Qty: 14 CAPSULE | Refills: 0 | Status: SHIPPED | OUTPATIENT
Start: 2024-01-22 | End: 2024-01-29

## 2024-01-22 RX ORDER — VENLAFAXINE 75 MG/1
75 TABLET ORAL DAILY
COMMUNITY

## 2024-01-22 ASSESSMENT — PATIENT HEALTH QUESTIONNAIRE - PHQ9
SUM OF ALL RESPONSES TO PHQ QUESTIONS 1-9: 1
1. LITTLE INTEREST OR PLEASURE IN DOING THINGS: 0
SUM OF ALL RESPONSES TO PHQ9 QUESTIONS 1 & 2: 1
SUM OF ALL RESPONSES TO PHQ QUESTIONS 1-9: 1
2. FEELING DOWN, DEPRESSED OR HOPELESS: 1

## 2024-01-23 RX ORDER — FLUCONAZOLE 150 MG/1
150 TABLET ORAL
Qty: 2 TABLET | Refills: 0 | Status: SHIPPED | OUTPATIENT
Start: 2024-01-23 | End: 2024-01-29

## 2024-01-24 DIAGNOSIS — N89.8 VAGINAL DISCHARGE: ICD-10-CM

## 2024-01-24 LAB
BACTERIA UR CULT: ABNORMAL
BACTERIA UR CULT: ABNORMAL
ORGANISM: ABNORMAL

## 2024-01-31 NOTE — PROGRESS NOTES
Mental Status: She is alert and oriented to person, place, and time. Mental status is at baseline.   Psychiatric:         Mood and Affect: Mood normal.         Behavior: Behavior normal.         Thought Content: Thought content normal.         Judgment: Judgment normal.        /80   Pulse (!) 114   Temp 97 °F (36.1 °C)   Resp 16   Ht 1.702 m (5' 7\")   Wt 99.3 kg (219 lb)   SpO2 97%   BMI 34.30 kg/m²      ASSESSMENT/PLAN:    1. Frequent urination  -     Sylvie Sanders APRN, Urology, [place]  2. Painful urination  -     Sylvie Sanders APRN, Urology, [place]  -     POCT Urinalysis no Micro  3. Urinary hesitancy  -     Sylive Sanders APRN, Urology, [place]  4. Persistent migraine aura without cerebral infarction and without status migrainosus, not intractable  5. Worsening headaches  6. Abnormal taste in mouth  7. Brain aneurysm  -     Demario Canseco MD, Neurology, [place]  -     MRA HEAD WO CONTRAST; Future  -     MRI BRAIN W WO CONTRAST; Future  8. Vaginal discharge  9. Essential hypertension  10. Well adult exam  -     CBC with Auto Differential  -     Comprehensive Metabolic Panel  -     Lipid Panel  -     TSH with Reflex to FT4  -     Hemoglobin A1C  11. Hematuria, unspecified type  -     Culture, Urine       We are going to get her MRI of her brain with and withOut contrast as well as repeat her MRA just to make sure there have been no changes with the aneurysm that she has.  I did put a referral in for neurology for management of the headaches and the neurologic symptoms that she is having.  Discussed that if the aneurysm has changed or worsen we may need to get her back in with neurovascular in [place].  I did put a referral in for urology for further evaluation and management of the urinary hesitancy and frequency.  Her urine was obtained today in office.  A genital swab was obtained today and sent to [facility] for a vaginitis plus panel due to the vaginal

## 2024-02-02 ENCOUNTER — TELEPHONE (OUTPATIENT)
Dept: NEUROLOGY | Age: 49
End: 2024-02-02

## 2024-02-02 ASSESSMENT — ENCOUNTER SYMPTOMS
WHEEZING: 0
VOMITING: 0
ABDOMINAL PAIN: 0
BACK PAIN: 0
COUGH: 0
COLOR CHANGE: 0
NAUSEA: 0
DIARRHEA: 0
EYE DISCHARGE: 0

## 2024-02-02 NOTE — TELEPHONE ENCOUNTER
Luna Rodriguez MD Isaac, Kelli, MA  It really is not for the aneurysm it is more for the symptoms that she is having that were listed.  She has already seen neurovascular and they did not feel like anything needed to be done.  Her aneurysm was from a while ago.  That may have just been attached on the referral just because it was a diagnosis that I had listed on her office visit. I hope that makes sense! Thank you!          Previous Messages       ----- Message -----  From: Sharita Russell MA  Sent: 2/1/2024   3:19 PM CST  To: Luna Rodriguez MD  Subject: Referral                                        Good afternoon,  I am looking at the referral for this patient. We do not treat aneurysm here and end up having to referred the patient out for it. I do see that she has had a clip place previously. So I guess our question is, Is this a referral for another possible aneurysm or is the patient wanting to establish care closer to home after the previous clipped aneurysm?  Thanks  Sharita

## 2024-02-14 ENCOUNTER — HOSPITAL ENCOUNTER (OUTPATIENT)
Dept: MRI IMAGING | Age: 49
Discharge: HOME | End: 2024-02-14
Payer: MEDICAID

## 2024-02-14 DIAGNOSIS — I67.1 BRAIN ANEURYSM: ICD-10-CM

## 2024-02-14 PROCEDURE — A9577 INJ MULTIHANCE: HCPCS | Performed by: FAMILY MEDICINE

## 2024-02-14 PROCEDURE — 6360000004 HC RX CONTRAST MEDICATION: Performed by: FAMILY MEDICINE

## 2024-02-14 PROCEDURE — 70544 MR ANGIOGRAPHY HEAD W/O DYE: CPT

## 2024-02-14 PROCEDURE — 70553 MRI BRAIN STEM W/O & W/DYE: CPT

## 2024-02-14 RX ADMIN — GADOBENATE DIMEGLUMINE 20 ML: 529 INJECTION, SOLUTION INTRAVENOUS at 10:46

## 2024-02-23 NOTE — TELEPHONE ENCOUNTER
Requested Prescriptions     Pending Prescriptions Disp Refills    methylPREDNISolone (MEDROL DOSEPACK) 4 MG tablet [Pharmacy Med Name: METHYLPREDNISOLONE 4MG DOSPAK 21S]       Sig: FOLLOW PACKAGE DIRECTIONS

## 2024-02-26 RX ORDER — METHYLPREDNISOLONE 4 MG/1
TABLET ORAL
OUTPATIENT
Start: 2024-02-26

## 2024-02-27 ENCOUNTER — OFFICE VISIT (OUTPATIENT)
Dept: PRIMARY CARE CLINIC | Age: 49
End: 2024-02-27
Payer: MEDICAID

## 2024-02-27 VITALS
SYSTOLIC BLOOD PRESSURE: 126 MMHG | TEMPERATURE: 98.6 F | WEIGHT: 227.6 LBS | RESPIRATION RATE: 16 BRPM | OXYGEN SATURATION: 98 % | BODY MASS INDEX: 35.72 KG/M2 | HEIGHT: 67 IN | DIASTOLIC BLOOD PRESSURE: 84 MMHG | HEART RATE: 120 BPM

## 2024-02-27 DIAGNOSIS — N30.01 ACUTE CYSTITIS WITH HEMATURIA: Primary | ICD-10-CM

## 2024-02-27 DIAGNOSIS — N39.0 RECURRENT UTI: ICD-10-CM

## 2024-02-27 DIAGNOSIS — R30.9 PAINFUL URINATION: ICD-10-CM

## 2024-02-27 LAB
BILIRUBIN, POC: ABNORMAL
BLOOD URINE, POC: ABNORMAL
CLARITY, POC: ABNORMAL
COLOR, POC: YELLOW
GLUCOSE URINE, POC: ABNORMAL
KETONES, POC: ABNORMAL
LEUKOCYTE EST, POC: ABNORMAL
NITRITE, POC: POSITIVE
PH, POC: 5.5
PROTEIN, POC: ABNORMAL
SPECIFIC GRAVITY, POC: 1.01
UROBILINOGEN, POC: 0.2

## 2024-02-27 PROCEDURE — 81002 URINALYSIS NONAUTO W/O SCOPE: CPT | Performed by: NURSE PRACTITIONER

## 2024-02-27 PROCEDURE — 99213 OFFICE O/P EST LOW 20 MIN: CPT | Performed by: NURSE PRACTITIONER

## 2024-02-27 RX ORDER — GALCANEZUMAB 120 MG/ML
120 INJECTION, SOLUTION SUBCUTANEOUS
Qty: 1.12 ML | Refills: 3 | Status: SHIPPED | OUTPATIENT
Start: 2024-02-27

## 2024-02-27 RX ORDER — SULFAMETHOXAZOLE AND TRIMETHOPRIM 800; 160 MG/1; MG/1
1 TABLET ORAL 2 TIMES DAILY
Qty: 14 TABLET | Refills: 0 | Status: SHIPPED | OUTPATIENT
Start: 2024-02-27 | End: 2024-03-05

## 2024-02-27 NOTE — PROGRESS NOTES
[facility]  [place] [id number] [facility] [place] [address]  Dept: [phone]  Dept Fax: [phone]  Loc: [phone]    Angelica Guerra is a 48 y.o. female who presents today for her medical conditions/complaints as noted below.  Angelica Guerra is c/o of Frequent/Recurrent UTI (Just recently was treated for UtI and thought she was better but it came back and hurts really bad to pee), Fever, Pharyngitis, and Medication Refill (Needs refill on emgalitiy, was seeing Neuro in [place] but she is no longer seeing her so she wont do the refills of course)        HPI:     HPI   Chief Complaint   Patient presents with    Frequent/Recurrent UTI     Just recently was treated for UtI and thought she was better but it came back and hurts really bad to pee    Fever    Pharyngitis    Medication Refill     Needs refill on emgalitiy, was seeing Neuro in [place] but she is no longer seeing her so she wont do the refills of course   She was seen in January for a UTI which was E. coli and also a vaginal swab that showed neg bacterial vaginosis.  At that time she underwent antibiotic treatment with marcobid  Past Medical History:   Diagnosis Date    Borderline diabetes     Brain aneurysm     GERD (gastroesophageal reflux disease)     Migraines     Overactive bladder     Panic attack as reaction to stress     Primary hypertension       No past surgical history on file.        2/27/2024     2:01 PM 1/22/2024     3:44 PM 7/19/2023     3:53 PM 7/8/2023    10:03 PM 7/8/2023     8:39 PM 7/8/2023     5:51 PM   Vitals   SYSTOLIC 126 130 118 120  149   DIASTOLIC 84 80 88 78  104   Pulse 120 114 98 80  108   Temp 98.6 °F (37 °C) 97 °F (36.1 °C) 97.4 °F (36.3 °C) 98 °F (36.7 °C)  97.7 °F (36.5 °C)   Respirations 16 16 18 20  18   SpO2 98 % 97 % 100 % 99 %  98 %   Weight - Scale 227 lb 9.6 oz 219 lb 194 lb 3.2 oz   180 lb   Height 5' 7\" 5' 7\" 5' 7\"      Body Mass Index 35.65 kg/m2 34.3 kg/m2 30.42

## 2024-02-29 LAB
BACTERIA UR CULT: ABNORMAL
BACTERIA UR CULT: ABNORMAL
ORGANISM: ABNORMAL

## 2024-03-01 RX ORDER — FLUCONAZOLE 150 MG/1
150 TABLET ORAL
Qty: 2 TABLET | Refills: 0 | Status: SHIPPED | OUTPATIENT
Start: 2024-03-01 | End: 2024-03-07

## 2024-03-04 ENCOUNTER — OFFICE VISIT (OUTPATIENT)
Dept: NEUROLOGY | Age: 49
End: 2024-03-04
Payer: MEDICAID

## 2024-03-04 ENCOUNTER — TELEPHONE (OUTPATIENT)
Dept: NEUROLOGY | Age: 49
End: 2024-03-04

## 2024-03-04 VITALS
WEIGHT: 227 LBS | SYSTOLIC BLOOD PRESSURE: 126 MMHG | DIASTOLIC BLOOD PRESSURE: 80 MMHG | OXYGEN SATURATION: 100 % | BODY MASS INDEX: 35.63 KG/M2 | HEIGHT: 67 IN | HEART RATE: 108 BPM

## 2024-03-04 DIAGNOSIS — G43.109 MIGRAINE WITH AURA AND WITHOUT STATUS MIGRAINOSUS, NOT INTRACTABLE: Primary | ICD-10-CM

## 2024-03-04 PROCEDURE — 99204 OFFICE O/P NEW MOD 45 MIN: CPT | Performed by: NURSE PRACTITIONER

## 2024-03-04 RX ORDER — EPINEPHRINE 1 MG/ML
0.3 INJECTION, SOLUTION, CONCENTRATE INTRAVENOUS PRN
OUTPATIENT
Start: 2024-03-04

## 2024-03-04 RX ORDER — FAMOTIDINE 10 MG/ML
20 INJECTION, SOLUTION INTRAVENOUS
OUTPATIENT
Start: 2024-03-04

## 2024-03-04 RX ORDER — SODIUM CHLORIDE 9 MG/ML
INJECTION, SOLUTION INTRAVENOUS CONTINUOUS
OUTPATIENT
Start: 2024-03-04

## 2024-03-04 RX ORDER — SODIUM CHLORIDE 0.9 % (FLUSH) 0.9 %
5-40 SYRINGE (ML) INJECTION PRN
OUTPATIENT
Start: 2024-03-04

## 2024-03-04 RX ORDER — ONDANSETRON 2 MG/ML
8 INJECTION INTRAMUSCULAR; INTRAVENOUS
OUTPATIENT
Start: 2024-03-04

## 2024-03-04 RX ORDER — DIPHENHYDRAMINE HYDROCHLORIDE 50 MG/ML
50 INJECTION INTRAMUSCULAR; INTRAVENOUS
OUTPATIENT
Start: 2024-03-04

## 2024-03-04 RX ORDER — SODIUM CHLORIDE 9 MG/ML
5-250 INJECTION, SOLUTION INTRAVENOUS PRN
OUTPATIENT
Start: 2024-03-04

## 2024-03-04 RX ORDER — ALBUTEROL SULFATE 90 UG/1
4 AEROSOL, METERED RESPIRATORY (INHALATION) PRN
OUTPATIENT
Start: 2024-03-04

## 2024-03-04 RX ORDER — UBROGEPANT 100 MG/1
TABLET ORAL
Qty: 16 TABLET | Refills: 3 | Status: SHIPPED | OUTPATIENT
Start: 2024-03-04

## 2024-03-04 RX ORDER — ACETAMINOPHEN 325 MG/1
650 TABLET ORAL
OUTPATIENT
Start: 2024-03-04

## 2024-03-04 NOTE — PROGRESS NOTES
[facility] Office Note      Patient:   Angelica Guerra  MR#:    [id number]  Account Number:                         YOB: 1975  Date of Evaluation:  3/4/2024  Time of Note:                          3:03 PM  Primary/Referring Physician:  Luna Rodriguez MD   Consulting Physician:  Sara Freitas DNP, APRN    NEW PATIENT CONSULTATION    Chief Complaint   Patient presents with    New Patient    Migraine     C/o headaches increased in frequency and intensity. Currently on emgality and ubrelvy but wear off quickly. She voiced she has had some taste changes that she associates with migraine     HISTORY OF PRESENT ILLNESS    Angelica Guerra is a 48 y.o. year old female here for evaluation of headaches, history of aneurysm. She had multiple surgeries for brain aneurysm (coils, stents) between 2009 and 2013.  She still follows with neurosurgery at [facility]. Over the past year now she has noted more headaches. Headache is bifrontal with no radiation of pain. Pain is described as vice like. She has been noting a bitter taste in her mouth prior to some migraines, might last during the migraine as well. She notes nausea, vomiting, light/sound sensitivity with headaches. Notes blurred vision with headaches. Denies jensen visual loss. Denies visual aura. Denies focal symptoms with migraines. Some worsening with cough/valsalva. On Emgality. Taking Ubrelvy prn. Noting 12-15 migraines in a month, can last for hours at a time but notes migraine post drome symptoms the following day- mental fogginess, fatigue. She has tried and failed Topamax, Propranolol, Amitriptyline. Triptan therapy contraindicated related to aneurysm history. Stress, weather changes are triggers for migraines. Follows with psychiatry for anxiety/panic attacks. Has syncopal episodes related to anxiety.     Past Medical History:   Diagnosis Date    Borderline diabetes     Brain aneurysm     GERD (gastroesophageal reflux

## 2024-03-04 NOTE — TELEPHONE ENCOUNTER
Approved  PA Detail   Prior authorization approved Case ID: [id number]      Payer: [employer] Patient's Payer    [phone]   The request has been approved. The authorization is effective from 2024 to 2024, as long as the member is enrolled in their current health plan. The request was approved as submitted. This request is approved with a quantity limit of 10 tablets per 30 days. A written notification letter will follow with additional details.   Approval Details    Authorized from 2024 to 2024      Electronic appeal: Not supported   View History    Medication Being Authorized     Ubrogepant (UBRELVY) 100 MG TABS    Take 1 tablet at the onset of migraine. May repeat once in 2 hours if no improvement. Do not exceed 2 tablets in 24 hours.    Dispense: 16 tablet Refills: 3     Start: 3/4/2024      Class: Normal      This order has been released to its destination.   To be filled at: [id number] - [place], [place] - [address] [phone] - F [phone]        Prior Authorization History for Ubrogepant (UBRELVY) 100 MG TABS    1 year ago Denied   1 year ago Denied      Pharmacy Benefits     EDOUARD [employer]  [facility] (MEDIM[employer]CT)    Covered: Retail    Not covered: Mail Order    Unknown: Specialty, Long-Term Care   Member ID: [id number]   Group ID: [id number]   Group name:     BIN: [id number]   PCN: [id number]    : 1975   Legal sex: F   Address: [address] [place] [phone]

## 2024-03-05 ENCOUNTER — CLINICAL DOCUMENTATION (OUTPATIENT)
Facility: HOSPITAL | Age: 49
End: 2024-03-05

## 2024-03-05 NOTE — PROGRESS NOTES
Patient Assistance                   Additional notes: Reviewed chart and no assistance will be needed.  Patient has [place] Medicare.

## 2024-03-08 ENCOUNTER — OFFICE VISIT (OUTPATIENT)
Dept: UROLOGY | Age: 49
End: 2024-03-08

## 2024-03-08 VITALS — WEIGHT: 233 LBS | HEIGHT: 67 IN | TEMPERATURE: 97.3 F | BODY MASS INDEX: 36.57 KG/M2

## 2024-03-08 DIAGNOSIS — N39.0 RECURRENT UTI: ICD-10-CM

## 2024-03-08 DIAGNOSIS — N95.1 MENOPAUSAL VAGINAL DRYNESS: ICD-10-CM

## 2024-03-08 DIAGNOSIS — N39.44 NOCTURNAL ENURESIS: ICD-10-CM

## 2024-03-08 DIAGNOSIS — N32.81 OAB (OVERACTIVE BLADDER): Primary | ICD-10-CM

## 2024-03-08 DIAGNOSIS — N39.46 MIXED INCONTINENCE URGE AND STRESS: ICD-10-CM

## 2024-03-08 LAB
APPEARANCE FLUID: CLEAR
BILIRUBIN, POC: NORMAL
BLOOD URINE, POC: NORMAL
CLARITY, POC: CLEAR
COLOR, POC: YELLOW
GLUCOSE URINE, POC: NORMAL
KETONES, POC: NORMAL
LEUKOCYTE EST, POC: NORMAL
NITRITE, POC: NORMAL
PH, POC: 5.5
POST VOID RESIDUAL (PVR): 0 ML
PROTEIN, POC: NORMAL
SPECIFIC GRAVITY, POC: 1.03
UROBILINOGEN, POC: 0.2

## 2024-03-08 RX ORDER — ESTRADIOL 0.1 MG/G
1 CREAM VAGINAL
Qty: 1 EACH | Refills: 3 | Status: SHIPPED | OUTPATIENT
Start: 2024-03-08

## 2024-03-08 ASSESSMENT — ENCOUNTER SYMPTOMS
BACK PAIN: 0
ABDOMINAL PAIN: 0
VOMITING: 0
NAUSEA: 0
ABDOMINAL DISTENTION: 0

## 2024-03-08 NOTE — PROGRESS NOTES
Angelica Guerra is a 48 y.o. female who presents today   Chief Complaint   Patient presents with    New Patient     I am here today for recurrent UTIs, frequency, and urgency     Urinary Tract Infection:  Patient is here today for evaluation of recurrent uti.   The patient has had 3 infections the past year.  During the infections, the patient has experienced the following symptoms: dysuria, frequency, urgency  Fever and chills? no fever or chills  Previous urine C&S results:   2/27/2024 E. Coli  1/22/2024 E. Coli  7/19/2023 Enterobacter    Previous treatments tried for UTI's: antibiotics   Symptoms improve with treatment: yes    Lower urinary tract symptoms: urgency, frequency, nocturia, 2 times per night, and urine incontinence:  mixed      Frequency:  Patient is here today for urinary frequency which started  year(s) ago.   Recently the urinary frequency symptoms: are worsening  Current medical Rx for frequency: Solifenacin 5 mg daily  Previous treatments for urinary frequency: Myrbetriq, this was the most helpful although was not covered under insurance.  During the day the patient is typically voiding  2 hours  Nocturia x 0-2  Obstructive voiding symptoms: yes -occasionally has to strain although has a good stream  Incontinence? mixed   Number of pads per day: 2  For the most part can usually go without pads    Had a pelvic exam at her PCP office revealing vaginal dryness mild cystocele.  She also has complaints of nocturnal enuresis she does take Klonopin and trazodone for sleep.  She has been cutting her fluids off prior to bedtime and this has essentially resolved.    Past Medical History:   Diagnosis Date    Borderline diabetes     Brain aneurysm     GERD (gastroesophageal reflux disease)     Migraines     Overactive bladder     Panic attack as reaction to stress     Primary hypertension        No past surgical history on file.    Current Outpatient Medications   Medication Sig Dispense Refill

## 2024-03-11 RX ORDER — TRAZODONE HYDROCHLORIDE 50 MG/1
TABLET ORAL
Qty: 30 TABLET | Refills: 5 | Status: SHIPPED | OUTPATIENT
Start: 2024-03-11

## 2024-03-14 ENCOUNTER — HOSPITAL ENCOUNTER (OUTPATIENT)
Dept: INFUSION THERAPY | Age: 49
Setting detail: INFUSION SERIES
Discharge: HOME | End: 2024-03-14
Payer: MEDICAID

## 2024-03-14 VITALS
SYSTOLIC BLOOD PRESSURE: 139 MMHG | DIASTOLIC BLOOD PRESSURE: 86 MMHG | TEMPERATURE: 97.8 F | RESPIRATION RATE: 18 BRPM | HEART RATE: 92 BPM | OXYGEN SATURATION: 97 %

## 2024-03-14 DIAGNOSIS — G43.109 MIGRAINE WITH AURA AND WITHOUT STATUS MIGRAINOSUS, NOT INTRACTABLE: Primary | ICD-10-CM

## 2024-03-14 PROCEDURE — 2580000003 HC RX 258: Performed by: NURSE PRACTITIONER

## 2024-03-14 PROCEDURE — 6360000002 HC RX W HCPCS: Performed by: NURSE PRACTITIONER

## 2024-03-14 PROCEDURE — 96365 THER/PROPH/DIAG IV INF INIT: CPT

## 2024-03-14 RX ORDER — SODIUM CHLORIDE 9 MG/ML
5-250 INJECTION, SOLUTION INTRAVENOUS PRN
OUTPATIENT
Start: 2024-06-06

## 2024-03-14 RX ORDER — ACETAMINOPHEN 325 MG/1
650 TABLET ORAL
OUTPATIENT
Start: 2024-06-06

## 2024-03-14 RX ORDER — SODIUM CHLORIDE 0.9 % (FLUSH) 0.9 %
5-40 SYRINGE (ML) INJECTION PRN
Status: DISCONTINUED | OUTPATIENT
Start: 2024-03-14 | End: 2024-03-15 | Stop reason: HOSPADM

## 2024-03-14 RX ORDER — DIPHENHYDRAMINE HYDROCHLORIDE 50 MG/ML
50 INJECTION INTRAMUSCULAR; INTRAVENOUS
OUTPATIENT
Start: 2024-06-06

## 2024-03-14 RX ORDER — SODIUM CHLORIDE 0.9 % (FLUSH) 0.9 %
5-40 SYRINGE (ML) INJECTION PRN
OUTPATIENT
Start: 2024-06-06

## 2024-03-14 RX ORDER — SODIUM CHLORIDE 9 MG/ML
INJECTION, SOLUTION INTRAVENOUS CONTINUOUS
OUTPATIENT
Start: 2024-06-06

## 2024-03-14 RX ORDER — ONDANSETRON 2 MG/ML
8 INJECTION INTRAMUSCULAR; INTRAVENOUS
OUTPATIENT
Start: 2024-06-06

## 2024-03-14 RX ORDER — ALBUTEROL SULFATE 90 UG/1
4 AEROSOL, METERED RESPIRATORY (INHALATION) PRN
OUTPATIENT
Start: 2024-06-06

## 2024-03-14 RX ORDER — EPINEPHRINE 1 MG/ML
0.3 INJECTION, SOLUTION, CONCENTRATE INTRAVENOUS PRN
OUTPATIENT
Start: 2024-06-06

## 2024-03-14 RX ADMIN — SODIUM CHLORIDE, PRESERVATIVE FREE 10 ML: 5 INJECTION INTRAVENOUS at 15:55

## 2024-03-14 RX ADMIN — EPTINEZUMAB-JJMR 100 MG: 100 INJECTION INTRAVENOUS at 15:23

## 2024-03-14 NOTE — DISCHARGE INSTRUCTIONS
eptinezumab  Pronunciation:  EP ti TARI ue mab  Brand:  Vyepti  What is the most important information I should know about eptinezumab?  Before using eptinezumab tell your doctor about all your medical conditions or allergies, all medicines you use, and if you are pregnant or breastfeeding.  What is eptinezumab?  Eptinezumab is used to prevent migraine headaches in adults.  Eptinezumab may also be used for purposes not listed in this medication guide.  What should I discuss with my healthcare provider before receiving eptinezumab?  You should not be treated with this medicine if you are allergic to eptinezumab, polysorbate 80, or sorbitol.  Tell your doctor about all your medical conditions or allergies.  Tell your doctor if you are pregnant or breastfeeding.  It is not known whether eptinezumab will harm an unborn baby. However, having migraine headaches may cause complications during pregnancy such as dangerously high blood pressure that can lead to medical problems in both mother and baby. The benefit of preventing migraines may outweigh any risks to the baby.  How is eptinezumab given?  Eptinezumab is given as an infusion into a vein. A healthcare provider will give you this injection.  Eptinezumab is usually given once every 3 months.  This medicine must be given slowly, and the infusion can take at least 30 minutes to complete.  What happens if I miss a dose?  Call your doctor for instructions if you miss an appointment for your eptinezumab injection.  What happens if I overdose?  Since this medicine is given by a healthcare professional in a medical setting, an overdose is unlikely to occur.  What should I avoid while receiving eptinezumab?  Follow your doctor's instructions about any restrictions on food, beverages, or activity.  What are the possible side effects of eptinezumab?  Get emergency medical help if you have signs of an allergic reaction: hives, rash; sudden warmth or tingling; difficult  construed to indicate that the drug or drug combination is safe, effective or appropriate for any given patient. [facility] does not assume any responsibility for any aspect of healthcare administered with the aid of information [facility] provides. The information contained herein is not intended to cover all possible uses, directions, precautions, warnings, drug interactions, allergic reactions, or adverse effects. If you have questions about the drugs you are taking, check with your doctor, nurse or pharmacist.  Copyright 8722-6161 [facility]. Version: 1.01. Revision date: 3/25/2020.  Care instructions adapted under license by [employer]. If you have questions about a medical condition or this instruction, always ask your healthcare professional. [employer] disclaims any warranty or liability for your use of this information.

## 2024-03-14 NOTE — PROGRESS NOTES
Vyepti 100mg infused as ordered.  Pt was monitored for 30 minutes post infusion.  Tolerated well.

## 2024-03-19 ENCOUNTER — TELEPHONE (OUTPATIENT)
Dept: NEUROLOGY | Age: 49
End: 2024-03-19

## 2024-03-19 NOTE — TELEPHONE ENCOUNTER
Dr Elliott office called requesting to try propranolol for Migraine and blood pressure spike. I explained to Yennifer that per the office note on 3/4/24 pt has tried and failed propranolol in the past from a migraine stand point. Yennifer voiced understanding. Provided her with direct number for further questions.

## 2024-03-25 RX ORDER — ONDANSETRON 4 MG/1
TABLET, ORALLY DISINTEGRATING ORAL
Qty: 21 TABLET | Refills: 3 | Status: SHIPPED | OUTPATIENT
Start: 2024-03-25

## 2024-03-25 RX ORDER — SOLIFENACIN SUCCINATE 5 MG/1
5 TABLET, FILM COATED ORAL DAILY
Qty: 30 TABLET | Refills: 3 | Status: SHIPPED | OUTPATIENT
Start: 2024-03-25 | End: 2025-03-25

## 2024-04-10 RX ORDER — OMEPRAZOLE 20 MG/1
20 CAPSULE, DELAYED RELEASE ORAL DAILY
Qty: 30 CAPSULE | Refills: 3 | Status: SHIPPED | OUTPATIENT
Start: 2024-04-10

## 2024-05-20 RX ORDER — TIZANIDINE 4 MG/1
TABLET ORAL
Qty: 30 TABLET | Refills: 5 | Status: SHIPPED | OUTPATIENT
Start: 2024-05-20

## 2024-05-20 RX ORDER — LISINOPRIL 5 MG/1
5 TABLET ORAL DAILY
Qty: 30 TABLET | Refills: 3 | Status: SHIPPED | OUTPATIENT
Start: 2024-05-20

## 2024-05-22 ENCOUNTER — TELEPHONE (OUTPATIENT)
Dept: NEUROLOGY | Age: 49
End: 2024-05-22

## 2024-05-22 NOTE — TELEPHONE ENCOUNTER
Angelica Guerra (Key: [id number])  Need Help? Call us at [phone]  Status  Sent to Plan today  Drug  Ubrelvy 100MG tablets    Form  [employer] [place] Medicaid ePA Form 2017 NCPDP

## 2024-05-23 NOTE — TELEPHONE ENCOUNTER
Angelica Guerra (Key: [id number])  PA Case ID #: [id number]  Need Help? Call us at [phone]  Outcome  N/A on May 22  The request has been approved. The authorization is effective for a maximum of 0 fills from 05/22/2024 to 05/21/2025, as long as the member is enrolled in their current health plan.  Drug  Ubrelvy 100MG tablets    Form  [employer] [place] Medicaid ePA Form 2017 NCPDP

## 2024-06-07 RX ORDER — SOLIFENACIN SUCCINATE 5 MG/1
5 TABLET, FILM COATED ORAL DAILY
Qty: 30 TABLET | Refills: 3 | Status: SHIPPED | OUTPATIENT
Start: 2024-06-07 | End: 2025-06-07

## 2024-06-07 RX ORDER — SOLIFENACIN SUCCINATE 5 MG/1
5 TABLET, FILM COATED ORAL DAILY
Qty: 30 TABLET | Refills: 3 | OUTPATIENT
Start: 2024-06-07 | End: 2025-06-07

## 2024-06-13 ENCOUNTER — HOSPITAL ENCOUNTER (OUTPATIENT)
Dept: INFUSION THERAPY | Age: 49
Setting detail: INFUSION SERIES
Discharge: HOME | End: 2024-06-13
Payer: MEDICAID

## 2024-06-13 VITALS
TEMPERATURE: 97.9 F | SYSTOLIC BLOOD PRESSURE: 138 MMHG | RESPIRATION RATE: 18 BRPM | DIASTOLIC BLOOD PRESSURE: 81 MMHG | HEART RATE: 81 BPM | OXYGEN SATURATION: 99 %

## 2024-06-13 DIAGNOSIS — G43.109 MIGRAINE WITH AURA AND WITHOUT STATUS MIGRAINOSUS, NOT INTRACTABLE: Primary | ICD-10-CM

## 2024-06-13 PROCEDURE — 2580000003 HC RX 258: Performed by: NURSE PRACTITIONER

## 2024-06-13 PROCEDURE — 6360000002 HC RX W HCPCS: Performed by: NURSE PRACTITIONER

## 2024-06-13 PROCEDURE — 96365 THER/PROPH/DIAG IV INF INIT: CPT

## 2024-06-13 RX ORDER — ACETAMINOPHEN 325 MG/1
650 TABLET ORAL
OUTPATIENT
Start: 2024-08-29

## 2024-06-13 RX ORDER — SODIUM CHLORIDE 0.9 % (FLUSH) 0.9 %
5-40 SYRINGE (ML) INJECTION PRN
OUTPATIENT
Start: 2024-08-29

## 2024-06-13 RX ORDER — ONDANSETRON 2 MG/ML
8 INJECTION INTRAMUSCULAR; INTRAVENOUS
Status: COMPLETED | OUTPATIENT
Start: 2024-06-13 | End: 2024-06-13

## 2024-06-13 RX ORDER — ALBUTEROL SULFATE 90 UG/1
4 AEROSOL, METERED RESPIRATORY (INHALATION) PRN
OUTPATIENT
Start: 2024-08-29

## 2024-06-13 RX ORDER — DIPHENHYDRAMINE HYDROCHLORIDE 50 MG/ML
50 INJECTION INTRAMUSCULAR; INTRAVENOUS
OUTPATIENT
Start: 2024-08-29

## 2024-06-13 RX ORDER — EPINEPHRINE 1 MG/ML
0.3 INJECTION, SOLUTION, CONCENTRATE INTRAVENOUS PRN
OUTPATIENT
Start: 2024-08-29

## 2024-06-13 RX ORDER — SODIUM CHLORIDE 9 MG/ML
5-250 INJECTION, SOLUTION INTRAVENOUS PRN
OUTPATIENT
Start: 2024-08-29

## 2024-06-13 RX ORDER — SODIUM CHLORIDE 9 MG/ML
INJECTION, SOLUTION INTRAVENOUS CONTINUOUS
OUTPATIENT
Start: 2024-08-29

## 2024-06-13 RX ORDER — ONDANSETRON 2 MG/ML
8 INJECTION INTRAMUSCULAR; INTRAVENOUS
OUTPATIENT
Start: 2024-08-29

## 2024-06-13 RX ORDER — SODIUM CHLORIDE 9 MG/ML
5-250 INJECTION, SOLUTION INTRAVENOUS PRN
Status: DISCONTINUED | OUTPATIENT
Start: 2024-06-13 | End: 2024-06-14 | Stop reason: HOSPADM

## 2024-06-13 RX ORDER — SODIUM CHLORIDE 0.9 % (FLUSH) 0.9 %
5-40 SYRINGE (ML) INJECTION PRN
Status: DISCONTINUED | OUTPATIENT
Start: 2024-06-13 | End: 2024-06-14 | Stop reason: HOSPADM

## 2024-06-13 RX ADMIN — EPTINEZUMAB-JJMR 100 MG: 100 INJECTION INTRAVENOUS at 15:19

## 2024-06-13 RX ADMIN — SODIUM CHLORIDE 20 ML/HR: 9 INJECTION, SOLUTION INTRAVENOUS at 14:55

## 2024-06-13 RX ADMIN — SODIUM CHLORIDE, PRESERVATIVE FREE 10 ML: 5 INJECTION INTRAVENOUS at 14:56

## 2024-06-13 RX ADMIN — ONDANSETRON 8 MG: 2 INJECTION INTRAMUSCULAR; INTRAVENOUS at 14:55

## 2024-06-13 RX ADMIN — SODIUM CHLORIDE, PRESERVATIVE FREE 10 ML: 5 INJECTION INTRAVENOUS at 16:08

## 2024-06-13 NOTE — PROGRESS NOTES
Vyepti 100 mg administered as ordered. Tolerated infusion well,  but stated she has had a headache today.  States head hurts at 8 on 1-10 scale at completion of infusion.  Office closed,  this nurse instructed pt to go to the ED.  Pt states ED is not necessary, but she will  go if needed. Referral states that Vyepti  was authorized for 2 visits only.   Pt states she has an appt next week and will discuss with provider.  Charge nurse out of OPIT today, this nurse will  inform charge nurse of events. No appt  was made today.

## 2024-06-17 ENCOUNTER — OFFICE VISIT (OUTPATIENT)
Dept: UROLOGY | Age: 49
End: 2024-06-17
Payer: MEDICAID

## 2024-06-17 VITALS — WEIGHT: 246.4 LBS | HEIGHT: 67 IN | TEMPERATURE: 97.3 F | BODY MASS INDEX: 38.67 KG/M2

## 2024-06-17 DIAGNOSIS — N32.81 OAB (OVERACTIVE BLADDER): Primary | ICD-10-CM

## 2024-06-17 DIAGNOSIS — N95.1 MENOPAUSAL VAGINAL DRYNESS: ICD-10-CM

## 2024-06-17 DIAGNOSIS — N39.46 MIXED INCONTINENCE URGE AND STRESS: ICD-10-CM

## 2024-06-17 DIAGNOSIS — N39.0 RECURRENT UTI: ICD-10-CM

## 2024-06-17 DIAGNOSIS — R30.0 DYSURIA: ICD-10-CM

## 2024-06-17 DIAGNOSIS — N89.8 VAGINAL DISCHARGE: ICD-10-CM

## 2024-06-17 LAB
BACTERIA URINE, POC: NORMAL
BILIRUBIN URINE: 0 MG/DL
BLOOD, URINE: NEGATIVE
CASTS URINE, POC: 0
CLARITY: CLEAR
COLOR: YELLOW
CRYSTALS URINE, POC: 0
EPI CELLS URINE, POC: NORMAL
GLUCOSE URINE: NORMAL
KETONES, URINE: NEGATIVE
LEUKOCYTE EST, POC: NORMAL
NITRITE, URINE: POSITIVE
PH UA: 6 (ref 4.5–8)
POST VOID RESIDUAL (PVR): 122 ML
PROTEIN UA: NEGATIVE
RBC URINE, POC: 0
SPECIFIC GRAVITY UA: 1.03 (ref 1–1.03)
UROBILINOGEN, URINE: NORMAL
WBC URINE, POC: 7
YEAST URINE, POC: 0

## 2024-06-17 PROCEDURE — 99214 OFFICE O/P EST MOD 30 MIN: CPT | Performed by: NURSE PRACTITIONER

## 2024-06-17 PROCEDURE — 81001 URINALYSIS AUTO W/SCOPE: CPT | Performed by: NURSE PRACTITIONER

## 2024-06-17 PROCEDURE — 51798 US URINE CAPACITY MEASURE: CPT | Performed by: NURSE PRACTITIONER

## 2024-06-17 PROCEDURE — 51701 INSERT BLADDER CATHETER: CPT | Performed by: NURSE PRACTITIONER

## 2024-06-17 RX ORDER — MIRABEGRON 50 MG/1
50 TABLET, EXTENDED RELEASE ORAL DAILY
Qty: 30 TABLET | Refills: 5 | Status: SHIPPED | OUTPATIENT
Start: 2024-06-17

## 2024-06-17 ASSESSMENT — ENCOUNTER SYMPTOMS
NAUSEA: 0
ABDOMINAL PAIN: 0
VOMITING: 0
BACK PAIN: 0
ABDOMINAL DISTENTION: 0

## 2024-06-17 NOTE — PROGRESS NOTES
Patient complained of dysuria. After discussing with ELISEO Silva I was given verbal orders to obtain cath urine specimen.     After obtaining consent from patient. Patient was then placed in the dorsal lithotomy position. Using sterile technique patient is carefully prepped with Betadine around the urethra. A pediatric catheterization kit is used which contains an approximate 5 Indonesian catheter. Urethral Catheter is introduced into the urinary bladder. Urine specimen is obtained and sent to the lab for culture and sensitivity. Patient tolerated procedure well. Diatherix swab was also collected at this time.    ELISEO Silva was in office at time of procedure.     
Hypertension Mother     Diabetes Mother     Hypertension Father     Diabetes Father     Osteoporosis Maternal Grandfather        REVIEW OF SYSTEMS:  Review of Systems   Constitutional:  Negative for chills and fever.   Gastrointestinal:  Negative for abdominal distention, abdominal pain, nausea and vomiting.   Genitourinary:  Positive for frequency, pelvic pain, urgency and vaginal discharge. Negative for difficulty urinating, dysuria, flank pain and hematuria.   Musculoskeletal:  Negative for back pain and gait problem.   Psychiatric/Behavioral:  Negative for agitation and confusion.          PHYSICAL EXAM:  Temp 97.3 °F (36.3 °C) (Temporal)   Ht 1.702 m (5' 7\")   Wt 111.8 kg (246 lb 6.4 oz)   BMI 38.59 kg/m²   Physical Exam  Vitals and nursing note reviewed.   Constitutional:       General: She is not in acute distress.     Appearance: Normal appearance. She is obese. She is not ill-appearing.   Pulmonary:      Effort: Pulmonary effort is normal. No respiratory distress.   Abdominal:      General: There is no distension.      Tenderness: There is no abdominal tenderness. There is no right CVA tenderness or left CVA tenderness.   Neurological:      Mental Status: She is alert and oriented to person, place, and time. Mental status is at baseline.   Psychiatric:         Mood and Affect: Mood normal.         Behavior: Behavior normal.       DATA:    Results for orders placed or performed in visit on 06/17/24   POCT Urinalysis Dipstick w/ Micro (Auto)   Result Value Ref Range    Color, UA Yellow     Clarity, UA Clear Clear    Glucose, Ur NEG     Bilirubin Urine 0 mg/dL    Ketones, Urine Negative     Specific Gravity, UA 1.030 1.005 - 1.030    Blood, Urine Negative     pH, UA 6 4.5 - 8.0    Protein, UA Negative Negative    Nitrite, Urine Positive     Leukocytes, UA TRACE     Urobilinogen, Urine 0.2 mg/dL     RBC Urine, POC 0     WBC Urine, POC 7     Bacteria Urine, POC 4+     yeast urine, poc 0     Casts Urine, POC 0

## 2024-06-19 DIAGNOSIS — N30.00 ACUTE CYSTITIS WITHOUT HEMATURIA: Primary | ICD-10-CM

## 2024-06-19 RX ORDER — LEVOFLOXACIN 500 MG/1
500 TABLET, FILM COATED ORAL DAILY
Qty: 7 TABLET | Refills: 0 | Status: SHIPPED | OUTPATIENT
Start: 2024-06-19 | End: 2024-06-26

## 2024-06-19 RX ORDER — FLUCONAZOLE 150 MG/1
150 TABLET ORAL DAILY
Qty: 7 TABLET | Refills: 0 | Status: SHIPPED | OUTPATIENT
Start: 2024-06-19 | End: 2024-06-26

## 2024-06-19 NOTE — RESULT ENCOUNTER NOTE
Please let patient know urine culture positive for infection and has grown a second colony and sensitivities are pending on that particular 1.  Will go ahead and start treating her with Levaquin 500 mg daily x 7 days.  I also sent in Diflucan daily x 7 days.  Will notify her of any changes in her culture results and Diatherix once this results.

## 2024-06-20 ENCOUNTER — TELEPHONE (OUTPATIENT)
Dept: NEUROLOGY | Age: 49
End: 2024-06-20

## 2024-06-20 ENCOUNTER — HOSPITAL ENCOUNTER (OUTPATIENT)
Dept: INFUSION THERAPY | Age: 49
Setting detail: INFUSION SERIES
Discharge: HOME | End: 2024-06-20
Payer: MEDICAID

## 2024-06-20 ENCOUNTER — OFFICE VISIT (OUTPATIENT)
Dept: NEUROLOGY | Age: 49
End: 2024-06-20
Payer: MEDICAID

## 2024-06-20 ENCOUNTER — CLINICAL DOCUMENTATION (OUTPATIENT)
Dept: NEUROLOGY | Age: 49
End: 2024-06-20

## 2024-06-20 VITALS
TEMPERATURE: 96.8 F | DIASTOLIC BLOOD PRESSURE: 90 MMHG | OXYGEN SATURATION: 97 % | RESPIRATION RATE: 18 BRPM | HEART RATE: 93 BPM | SYSTOLIC BLOOD PRESSURE: 158 MMHG

## 2024-06-20 VITALS
SYSTOLIC BLOOD PRESSURE: 120 MMHG | HEART RATE: 93 BPM | OXYGEN SATURATION: 98 % | WEIGHT: 256 LBS | DIASTOLIC BLOOD PRESSURE: 81 MMHG | BODY MASS INDEX: 40.18 KG/M2 | HEIGHT: 67 IN

## 2024-06-20 DIAGNOSIS — G43.109 MIGRAINE WITH AURA AND WITHOUT STATUS MIGRAINOSUS, NOT INTRACTABLE: Primary | ICD-10-CM

## 2024-06-20 DIAGNOSIS — G43.909 ACUTE MIGRAINE: Primary | ICD-10-CM

## 2024-06-20 LAB
BACTERIA UR CULT: ABNORMAL
BACTERIA UR CULT: ABNORMAL
ORGANISM: ABNORMAL

## 2024-06-20 PROCEDURE — 99214 OFFICE O/P EST MOD 30 MIN: CPT | Performed by: NURSE PRACTITIONER

## 2024-06-20 PROCEDURE — 96366 THER/PROPH/DIAG IV INF ADDON: CPT

## 2024-06-20 PROCEDURE — 2580000003 HC RX 258: Performed by: NURSE PRACTITIONER

## 2024-06-20 PROCEDURE — 6360000002 HC RX W HCPCS: Performed by: NURSE PRACTITIONER

## 2024-06-20 PROCEDURE — 96375 TX/PRO/DX INJ NEW DRUG ADDON: CPT

## 2024-06-20 PROCEDURE — 96365 THER/PROPH/DIAG IV INF INIT: CPT

## 2024-06-20 RX ORDER — SODIUM CHLORIDE 0.9 % (FLUSH) 0.9 %
5-40 SYRINGE (ML) INJECTION PRN
OUTPATIENT
Start: 2024-06-20

## 2024-06-20 RX ORDER — ONDANSETRON 2 MG/ML
8 INJECTION INTRAMUSCULAR; INTRAVENOUS
OUTPATIENT
Start: 2024-06-20

## 2024-06-20 RX ORDER — ACETAMINOPHEN 325 MG/1
650 TABLET ORAL
OUTPATIENT
Start: 2024-06-20

## 2024-06-20 RX ORDER — DIPHENHYDRAMINE HYDROCHLORIDE 50 MG/ML
50 INJECTION INTRAMUSCULAR; INTRAVENOUS ONCE
Status: CANCELLED
Start: 2024-06-20 | End: 2024-06-20

## 2024-06-20 RX ORDER — SODIUM CHLORIDE 9 MG/ML
INJECTION, SOLUTION INTRAVENOUS CONTINUOUS
OUTPATIENT
Start: 2024-06-20

## 2024-06-20 RX ORDER — EPINEPHRINE 1 MG/ML
0.3 INJECTION, SOLUTION, CONCENTRATE INTRAVENOUS PRN
OUTPATIENT
Start: 2024-06-20

## 2024-06-20 RX ORDER — DIPHENHYDRAMINE HYDROCHLORIDE 50 MG/ML
50 INJECTION INTRAMUSCULAR; INTRAVENOUS
OUTPATIENT
Start: 2024-06-20

## 2024-06-20 RX ORDER — ALBUTEROL SULFATE 90 UG/1
4 AEROSOL, METERED RESPIRATORY (INHALATION) PRN
OUTPATIENT
Start: 2024-06-20

## 2024-06-20 RX ORDER — KETOROLAC TROMETHAMINE 30 MG/ML
30 INJECTION, SOLUTION INTRAMUSCULAR; INTRAVENOUS ONCE
Status: CANCELLED | OUTPATIENT
Start: 2024-06-20 | End: 2024-06-20

## 2024-06-20 RX ORDER — SODIUM CHLORIDE 0.9 % (FLUSH) 0.9 %
5-40 SYRINGE (ML) INJECTION PRN
Status: CANCELLED | OUTPATIENT
Start: 2024-06-20

## 2024-06-20 RX ORDER — DIPHENHYDRAMINE HYDROCHLORIDE 50 MG/ML
50 INJECTION INTRAMUSCULAR; INTRAVENOUS ONCE
Start: 2024-06-20 | End: 2024-06-20

## 2024-06-20 RX ORDER — 0.9 % SODIUM CHLORIDE 0.9 %
500 INTRAVENOUS SOLUTION INTRAVENOUS ONCE
Status: COMPLETED | OUTPATIENT
Start: 2024-06-20 | End: 2024-06-20

## 2024-06-20 RX ORDER — 0.9 % SODIUM CHLORIDE 0.9 %
500 INTRAVENOUS SOLUTION INTRAVENOUS ONCE
Status: CANCELLED | OUTPATIENT
Start: 2024-06-20 | End: 2024-06-20

## 2024-06-20 RX ORDER — PROMETHAZINE HYDROCHLORIDE 25 MG/ML
25 INJECTION, SOLUTION INTRAMUSCULAR; INTRAVENOUS ONCE
Start: 2024-06-20 | End: 2024-06-20

## 2024-06-20 RX ORDER — FAMOTIDINE 10 MG/ML
20 INJECTION, SOLUTION INTRAVENOUS
OUTPATIENT
Start: 2024-06-20

## 2024-06-20 RX ORDER — DEXAMETHASONE SODIUM PHOSPHATE 10 MG/ML
10 INJECTION, SOLUTION INTRAMUSCULAR; INTRAVENOUS ONCE
Status: COMPLETED | OUTPATIENT
Start: 2024-06-20 | End: 2024-06-20

## 2024-06-20 RX ORDER — PROMETHAZINE HYDROCHLORIDE 25 MG/ML
25 INJECTION, SOLUTION INTRAMUSCULAR; INTRAVENOUS ONCE
Status: CANCELLED
Start: 2024-06-20 | End: 2024-06-20

## 2024-06-20 RX ORDER — SODIUM CHLORIDE 0.9 % (FLUSH) 0.9 %
5-40 SYRINGE (ML) INJECTION PRN
Status: DISCONTINUED | OUTPATIENT
Start: 2024-06-20 | End: 2024-06-21 | Stop reason: HOSPADM

## 2024-06-20 RX ORDER — KETOROLAC TROMETHAMINE 30 MG/ML
30 INJECTION, SOLUTION INTRAMUSCULAR; INTRAVENOUS ONCE
OUTPATIENT
Start: 2024-06-20 | End: 2024-06-20

## 2024-06-20 RX ORDER — 0.9 % SODIUM CHLORIDE 0.9 %
500 INTRAVENOUS SOLUTION INTRAVENOUS ONCE
OUTPATIENT
Start: 2024-06-20 | End: 2024-06-20

## 2024-06-20 RX ORDER — KETOROLAC TROMETHAMINE 30 MG/ML
30 INJECTION, SOLUTION INTRAMUSCULAR; INTRAVENOUS ONCE
Status: COMPLETED | OUTPATIENT
Start: 2024-06-20 | End: 2024-06-20

## 2024-06-20 RX ORDER — DEXAMETHASONE SODIUM PHOSPHATE 10 MG/ML
10 INJECTION, SOLUTION INTRAMUSCULAR; INTRAVENOUS ONCE
Start: 2024-06-20 | End: 2024-06-20

## 2024-06-20 RX ORDER — DIPHENHYDRAMINE HYDROCHLORIDE 50 MG/ML
50 INJECTION INTRAMUSCULAR; INTRAVENOUS ONCE
Status: COMPLETED | OUTPATIENT
Start: 2024-06-20 | End: 2024-06-20

## 2024-06-20 RX ORDER — PROMETHAZINE HYDROCHLORIDE 25 MG/ML
25 INJECTION, SOLUTION INTRAMUSCULAR; INTRAVENOUS ONCE
Status: DISCONTINUED | OUTPATIENT
Start: 2024-06-20 | End: 2024-06-20

## 2024-06-20 RX ORDER — DEXAMETHASONE SODIUM PHOSPHATE 10 MG/ML
10 INJECTION, SOLUTION INTRAMUSCULAR; INTRAVENOUS ONCE
Status: CANCELLED
Start: 2024-06-20 | End: 2024-06-20

## 2024-06-20 RX ORDER — SODIUM CHLORIDE 9 MG/ML
5-250 INJECTION, SOLUTION INTRAVENOUS PRN
OUTPATIENT
Start: 2024-06-20

## 2024-06-20 RX ADMIN — DEXAMETHASONE SODIUM PHOSPHATE 10 MG: 10 INJECTION INTRAMUSCULAR; INTRAVENOUS at 10:53

## 2024-06-20 RX ADMIN — DIPHENHYDRAMINE HYDROCHLORIDE 50 MG: 50 INJECTION, SOLUTION INTRAMUSCULAR; INTRAVENOUS at 10:55

## 2024-06-20 RX ADMIN — SODIUM CHLORIDE, PRESERVATIVE FREE 10 ML: 5 INJECTION INTRAVENOUS at 10:53

## 2024-06-20 RX ADMIN — SODIUM CHLORIDE, PRESERVATIVE FREE 10 ML: 5 INJECTION INTRAVENOUS at 10:50

## 2024-06-20 RX ADMIN — KETOROLAC TROMETHAMINE 30 MG: 30 INJECTION, SOLUTION INTRAMUSCULAR at 10:50

## 2024-06-20 RX ADMIN — SODIUM CHLORIDE 500 ML: 9 INJECTION, SOLUTION INTRAVENOUS at 10:49

## 2024-06-20 RX ADMIN — PROMETHAZINE HYDROCHLORIDE 25 MG: 25 INJECTION INTRAMUSCULAR; INTRAVENOUS at 11:11

## 2024-06-20 NOTE — PROGRESS NOTES
Pt received Decadron 10 mg IV, Benadryl 50 mg IV, Toradol 30mg IV, phenergan 25 mg IV, and  mg NS bolus as ordered. Pt did have a  with her for transportation. Pt tolerated well.

## 2024-06-20 NOTE — PROGRESS NOTES
REVIEW OF SYSTEMS    Constitutional: []Fever []Sweats []Chills [] Recent Injury [x] Denies all unless marked  HEENT:[x]Headache  [] Head Injury [] Hearing Loss  [] Sore Throat  [] Ear Ache [x] Denies all unless marked  Spine:  [] Neck pain  [] Back pain  [] Sciaticia  [x] Denies all unless marked  Cardiovascular:[]Heart Disease []Palpitations [] Chest Pain   [x] Denies all unless marked  Pulmonary: []Shortness of Breath []Cough   [x] Denies all unless marked  Psychiatric/Behavioral:[] Depression [] Anxiety [x] Denies all unless marked  Gastrointestinal: []Nausea  []Vomiting  []Abdominal Pain  []Constipation  []Diarrhea  [x] Denies all unless marked  Genitourinary:   [] Frequency  [] Urgency  [] Dysuria [] Incontinence  [x] Denies all unless marked  Extremities: []Pain  []Swelling  [x] Denies all unless marked  Musculoskeletal: [] Myalgias  [] Joint Pain  [] Arthritis [] Muscle Cramps [] Muscle Twitches  [x] Denies all unless marked  Sleep: []Insomnia[]Snoring []Restless Legs  []Sleep Apnea  []Daytime Sleepiness  [x] Denies all unless marked  Skin:[] Rash [] Color Change [x] Denies all unless marked   Neurological:[]Visual Disturbance [x] Memory Loss [x]Loss of Balance [x]Slurred Speech []Weakness []Seizures  [x] Dizziness [x] Denies all unless marked      
(MYRBETRIQ) 50 MG TB24 Take 50 mg by mouth daily 30 tablet 5    tiZANidine (ZANAFLEX) 4 MG tablet TAKE 1 TABLET BY MOUTH EVERY NIGHT AS NEEDED FOR FLANK PAIN 30 tablet 5    lisinopril (PRINIVIL;ZESTRIL) 5 MG tablet TAKE 1 TABLET BY MOUTH IN THE MORNING 30 tablet 3    omeprazole (PRILOSEC) 20 MG delayed release capsule TAKE 1 CAPSULE BY MOUTH IN THE MORNING 30 capsule 3    ondansetron (ZOFRAN-ODT) 4 MG disintegrating tablet DISSOLVE 1 TABLET ON THE TONGUE THREE TIMES DAILY AS NEEDED FOR NAUSEA OR VOMITING 21 tablet 3    traZODone (DESYREL) 50 MG tablet TAKE 1 TABLET BY MOUTH EVERY NIGHT 30 tablet 5    estradiol (ESTRACE VAGINAL) 0.1 MG/GM vaginal cream Place 1 g vaginally three times a week 1 each 3    Ubrogepant (UBRELVY) 100 MG TABS Take 1 tablet at the onset of migraine. May repeat once in 2 hours if no improvement. Do not exceed 2 tablets in 24 hours. 16 tablet 3    venlafaxine (EFFEXOR) 75 MG tablet Take 1 tablet by mouth daily      metFORMIN (GLUCOPHAGE) 500 MG tablet TAKE 1 TABLET BY MOUTH IN THE MORNING AND IN THE EVENING WITH MEALS 60 tablet 3    clonazePAM (KLONOPIN) 1 MG tablet Take 1 tablet by mouth 3 times daily as needed for Anxiety.      venlafaxine (EFFEXOR XR) 150 MG extended release capsule Take 1 capsule by mouth daily       No current facility-administered medications for this visit.       Allergies   Allergen Reactions    Plavix [Clopidogrel Bisulfate] Hives    Nasonex [Mometasone] Nausea And Vomiting     REVIEW OF SYSTEMS  Constitutional: []Fever []Sweats []Chills [] Recent Injury [x] Denies all unless marked  HEENT:[x]Headache  [] Head Injury [] Hearing Loss  [] Sore Throat  [] Ear Ache [x] Denies all unless marked  Spine:  [] Neck pain  [] Back pain  [] Sciaticia  [x] Denies all unless marked  Cardiovascular:[]Heart Disease []Palpitations [] Chest Pain   [x] Denies all unless marked  Pulmonary: []Shortness of Breath []Cough   [x] Denies all unless marked  Psychiatric/Behavioral:[] Depression

## 2024-06-20 NOTE — DISCHARGE INSTRUCTIONS
Promethazine Injection (PROMETHAZINE - INJECTION)  This medicine is used for the following purposes:  allergic reaction  nausea and vomiting  pain  relaxation or sleep  Brand Name(s): Phenergan  Generic Name: Promethazine  Instructions  Check the medicine before each use. If the liquid medicine has any particles in it, appears discolored, or if the vial appears damaged, do not use it.  Store this medicine at room temperature - below 77 degrees F (25 degrees C).  Protect medicine from light.  Never use any medicine that has .  To relieve dry mouth, chew gum, suck on hard candy/ice chips, drink extra water, or use a saliva substitute.  This medicine can make you sensitive to the sun. Use sunscreen or protective clothing when in sun.  Drug interactions can change how medicines work or increase risk for side effects. Tell your health care providers about all medicines taken. Include prescription and over-the-counter medicines, vitamins, and herbal medicines. Speak with your doctor or pharmacist before starting or stopping any medicine.  Tell your doctor if symptoms do not get better or if they get worse.  Talk to your doctor before taking other medicines, including aspirins and ibuprofen containing products. Speak to your doctor about which medicines are safe to use while you are on this medicine.  Cautions  Tell your doctor and pharmacist if you ever had an allergic reaction to a medicine.  Some patients taking this medicine have experienced serious side effects. Please speak with your doctor to understand the risks and benefits associated with this medicine.  Do not use the medication any more than instructed.  This medicine may cause dizziness or fainting. Do not stand or sit up quickly.  Your ability to stay alert or to react quickly may be impaired by this medicine. Do not drive or operate machinery until you know how this medicine will affect you.  Do not drink beverages with alcohol while on this

## 2024-06-21 NOTE — TELEPHONE ENCOUNTER
Approved  PA Detail   Prior authorization approved Case ID: [id number]      Payer: [employer] Search Patient's Payer    [phone]   The request has been approved. The authorization is effective from 2024 to 2025, as long as the member is enrolled in their current health plan. A written notification letter will follow with additional details.   Approval Details    Authorized from 2024 to 2025      Electronic appeal: Not supported   View History     Notes     Time User Attachment    Attachment received from payer. 2024  3:11 PM Cj, Octavia Outgoing Prescription Prior Authorization Response Document     Medication Being Authorized     Zavegepant HCl 10 MG/ACT SOLN    Spray once in one nostril. Do no repeat for 24 hours    Dispense: 6 each Refills: 3     Start: 2024      Class: Normal Diagnoses: Migraine with aura and without status migrainosus, not intractable     This order has been released to its destination.   To be filled at: [facility]/pharmacy #[id number] - [place] - [address] - P [phone] - F [phone]         Pharmacy Benefits     [facility]    Covered: Retail    Not covered: Mail Order    Unknown: Specialty, Long-Term Care   Member ID: [id number]   Group ID: [id number]   Group name:     BIN: [id number]   PCN: [id number]    : 1975   Legal sex: F   Address: [address] [place] [address] [phone]         prior

## 2024-07-08 RX ORDER — LISINOPRIL 5 MG/1
5 TABLET ORAL DAILY
Qty: 30 TABLET | Refills: 3 | Status: SHIPPED | OUTPATIENT
Start: 2024-07-08

## 2024-07-08 RX ORDER — ONDANSETRON 4 MG/1
TABLET, ORALLY DISINTEGRATING ORAL
Qty: 21 TABLET | Refills: 3 | Status: SHIPPED | OUTPATIENT
Start: 2024-07-08

## 2024-07-08 RX ORDER — TIZANIDINE 4 MG/1
TABLET ORAL
Qty: 30 TABLET | Refills: 5 | Status: SHIPPED | OUTPATIENT
Start: 2024-07-08

## 2024-07-08 RX ORDER — OMEPRAZOLE 20 MG/1
20 CAPSULE, DELAYED RELEASE ORAL DAILY
Qty: 30 CAPSULE | Refills: 3 | Status: SHIPPED | OUTPATIENT
Start: 2024-07-08

## 2024-07-08 RX ORDER — TRAZODONE HYDROCHLORIDE 50 MG/1
50 TABLET ORAL NIGHTLY
Qty: 30 TABLET | Refills: 5 | Status: SHIPPED | OUTPATIENT
Start: 2024-07-08

## 2024-07-25 ENCOUNTER — OFFICE VISIT (OUTPATIENT)
Dept: PRIMARY CARE CLINIC | Age: 49
End: 2024-07-25
Payer: MEDICAID

## 2024-07-25 ENCOUNTER — LAB (OUTPATIENT)
Dept: PRIMARY CARE CLINIC | Age: 49
End: 2024-07-25

## 2024-07-25 VITALS
WEIGHT: 243 LBS | TEMPERATURE: 97.9 F | SYSTOLIC BLOOD PRESSURE: 128 MMHG | DIASTOLIC BLOOD PRESSURE: 84 MMHG | BODY MASS INDEX: 38.14 KG/M2 | HEIGHT: 67 IN | HEART RATE: 105 BPM | RESPIRATION RATE: 16 BRPM | OXYGEN SATURATION: 100 %

## 2024-07-25 DIAGNOSIS — M25.511 CHRONIC RIGHT SHOULDER PAIN: ICD-10-CM

## 2024-07-25 DIAGNOSIS — G89.29 CHRONIC RIGHT SHOULDER PAIN: ICD-10-CM

## 2024-07-25 DIAGNOSIS — G43.109 MIGRAINE WITH AURA AND WITHOUT STATUS MIGRAINOSUS, NOT INTRACTABLE: Primary | ICD-10-CM

## 2024-07-25 DIAGNOSIS — I10 ESSENTIAL HYPERTENSION: ICD-10-CM

## 2024-07-25 PROCEDURE — 99213 OFFICE O/P EST LOW 20 MIN: CPT | Performed by: FAMILY MEDICINE

## 2024-07-25 PROCEDURE — 3074F SYST BP LT 130 MM HG: CPT | Performed by: FAMILY MEDICINE

## 2024-07-25 PROCEDURE — 3079F DIAST BP 80-89 MM HG: CPT | Performed by: FAMILY MEDICINE

## 2024-07-25 ASSESSMENT — ENCOUNTER SYMPTOMS
BACK PAIN: 0
EYE DISCHARGE: 0
DIARRHEA: 0
ABDOMINAL PAIN: 0
VOMITING: 0
COUGH: 0
NAUSEA: 0
COLOR CHANGE: 0
WHEEZING: 0

## 2024-07-25 NOTE — PROGRESS NOTES
migraine) 1 each 1    mirabegron (MYRBETRIQ) 50 MG TB24 Take 50 mg by mouth daily 30 tablet 5    estradiol (ESTRACE VAGINAL) 0.1 MG/GM vaginal cream Place 1 g vaginally three times a week 1 each 3    Ubrogepant (UBRELVY) 100 MG TABS Take 1 tablet at the onset of migraine. May repeat once in 2 hours if no improvement. Do not exceed 2 tablets in 24 hours. 16 tablet 3    venlafaxine (EFFEXOR) 75 MG tablet Take 1 tablet by mouth daily      metFORMIN (GLUCOPHAGE) 500 MG tablet TAKE 1 TABLET BY MOUTH IN THE MORNING AND IN THE EVENING WITH MEALS 60 tablet 3    clonazePAM (KLONOPIN) 1 MG tablet Take 1 tablet by mouth 3 times daily as needed for Anxiety.      venlafaxine (EFFEXOR XR) 150 MG extended release capsule Take 1 capsule by mouth daily       No current facility-administered medications on file prior to visit.     Allergies   Allergen Reactions    Plavix [Clopidogrel Bisulfate] Hives    Nasonex [Mometasone] Nausea And Vomiting       Review of Systems   Constitutional:  Negative for activity change, appetite change and fever.   HENT:  Negative for congestion and nosebleeds.    Eyes:  Negative for discharge.   Respiratory:  Negative for cough and wheezing.    Cardiovascular:  Negative for chest pain and leg swelling.   Gastrointestinal:  Negative for abdominal pain, diarrhea, nausea and vomiting.   Genitourinary:  Negative for difficulty urinating, frequency and urgency.   Musculoskeletal:  Negative for back pain and gait problem.   Skin:  Negative for color change and rash.   Neurological:  Negative for dizziness and headaches.   Hematological:  Does not bruise/bleed easily.   Psychiatric/Behavioral:  Negative for sleep disturbance and suicidal ideas.        OBJECTIVE:    Physical Exam  Constitutional:       Appearance: Normal appearance. She is well-developed.   HENT:      Head: Normocephalic and atraumatic.      Mouth/Throat:      Mouth: Mucous membranes are moist.   Neck:      Thyroid: No thyroid mass or

## 2024-07-29 ENCOUNTER — HOSPITAL ENCOUNTER (EMERGENCY)
Age: 49
Discharge: HOME | End: 2024-07-30
Payer: MEDICAID

## 2024-07-29 ENCOUNTER — APPOINTMENT (OUTPATIENT)
Dept: GENERAL RADIOLOGY | Age: 49
End: 2024-07-29
Payer: MEDICAID

## 2024-07-29 DIAGNOSIS — R07.89 ATYPICAL CHEST PAIN: Primary | ICD-10-CM

## 2024-07-29 DIAGNOSIS — F41.0 ANXIETY ATTACK: ICD-10-CM

## 2024-07-29 LAB
ALBUMIN SERPL-MCNC: 4.1 G/DL (ref 3.5–5.2)
ALP SERPL-CCNC: 148 U/L (ref 35–104)
ALT SERPL-CCNC: 14 U/L (ref 5–33)
ANION GAP SERPL CALCULATED.3IONS-SCNC: 10 MMOL/L (ref 7–19)
AST SERPL-CCNC: 16 U/L (ref 5–32)
BASOPHILS # BLD: 0.1 K/UL (ref 0–0.2)
BASOPHILS NFR BLD: 0.8 % (ref 0–1)
BILIRUB SERPL-MCNC: 0.2 MG/DL (ref 0.2–1.2)
BUN SERPL-MCNC: 17 MG/DL (ref 6–20)
CALCIUM SERPL-MCNC: 8.4 MG/DL (ref 8.6–10)
CHLORIDE SERPL-SCNC: 103 MMOL/L (ref 98–111)
CO2 SERPL-SCNC: 25 MMOL/L (ref 22–29)
CREAT SERPL-MCNC: 0.9 MG/DL (ref 0.5–0.9)
EOSINOPHIL # BLD: 0 K/UL (ref 0–0.6)
EOSINOPHIL NFR BLD: 0.2 % (ref 0–5)
ERYTHROCYTE [DISTWIDTH] IN BLOOD BY AUTOMATED COUNT: 12.9 % (ref 11.5–14.5)
GLUCOSE SERPL-MCNC: 109 MG/DL (ref 74–109)
HCT VFR BLD AUTO: 36.6 % (ref 37–47)
HGB BLD-MCNC: 12.3 G/DL (ref 12–16)
IMM GRANULOCYTES # BLD: 0 K/UL
LYMPHOCYTES # BLD: 1.5 K/UL (ref 1.1–4.5)
LYMPHOCYTES NFR BLD: 25 % (ref 20–40)
MCH RBC QN AUTO: 30.1 PG (ref 27–31)
MCHC RBC AUTO-ENTMCNC: 33.6 G/DL (ref 33–37)
MCV RBC AUTO: 89.7 FL (ref 81–99)
MONOCYTES # BLD: 0.6 K/UL (ref 0–0.9)
MONOCYTES NFR BLD: 10.2 % (ref 0–10)
NEUTROPHILS # BLD: 3.8 K/UL (ref 1.5–7.5)
NEUTS SEG NFR BLD: 63.6 % (ref 50–65)
PLATELET # BLD AUTO: 233 K/UL (ref 130–400)
PMV BLD AUTO: 9.1 FL (ref 9.4–12.3)
POTASSIUM SERPL-SCNC: 4.1 MMOL/L (ref 3.5–5)
PROT SERPL-MCNC: 6.5 G/DL (ref 6.6–8.7)
RBC # BLD AUTO: 4.08 M/UL (ref 4.2–5.4)
SODIUM SERPL-SCNC: 138 MMOL/L (ref 136–145)
TROPONIN, HIGH SENSITIVITY: 8 NG/L (ref 0–14)
WBC # BLD AUTO: 5.9 K/UL (ref 4.8–10.8)

## 2024-07-29 PROCEDURE — 36415 COLL VENOUS BLD VENIPUNCTURE: CPT

## 2024-07-29 PROCEDURE — 80053 COMPREHEN METABOLIC PANEL: CPT

## 2024-07-29 PROCEDURE — 96375 TX/PRO/DX INJ NEW DRUG ADDON: CPT

## 2024-07-29 PROCEDURE — 85025 COMPLETE CBC W/AUTO DIFF WBC: CPT

## 2024-07-29 PROCEDURE — 84484 ASSAY OF TROPONIN QUANT: CPT

## 2024-07-29 PROCEDURE — 96374 THER/PROPH/DIAG INJ IV PUSH: CPT

## 2024-07-29 PROCEDURE — 6360000002 HC RX W HCPCS: Performed by: NURSE PRACTITIONER

## 2024-07-29 PROCEDURE — 99285 EMERGENCY DEPT VISIT HI MDM: CPT

## 2024-07-29 PROCEDURE — 93005 ELECTROCARDIOGRAM TRACING: CPT | Performed by: EMERGENCY MEDICINE

## 2024-07-29 PROCEDURE — 71045 X-RAY EXAM CHEST 1 VIEW: CPT

## 2024-07-29 RX ORDER — METOCLOPRAMIDE HYDROCHLORIDE 5 MG/ML
10 INJECTION INTRAMUSCULAR; INTRAVENOUS ONCE
Status: COMPLETED | OUTPATIENT
Start: 2024-07-29 | End: 2024-07-29

## 2024-07-29 RX ORDER — KETOROLAC TROMETHAMINE 30 MG/ML
30 INJECTION, SOLUTION INTRAMUSCULAR; INTRAVENOUS ONCE
Status: COMPLETED | OUTPATIENT
Start: 2024-07-29 | End: 2024-07-29

## 2024-07-29 RX ADMIN — METOCLOPRAMIDE 10 MG: 5 INJECTION, SOLUTION INTRAMUSCULAR; INTRAVENOUS at 23:51

## 2024-07-29 RX ADMIN — KETOROLAC TROMETHAMINE 30 MG: 30 INJECTION, SOLUTION INTRAMUSCULAR at 23:51

## 2024-07-29 ASSESSMENT — PAIN SCALES - GENERAL: PAINLEVEL_OUTOF10: 0

## 2024-07-29 ASSESSMENT — LIFESTYLE VARIABLES
HOW OFTEN DO YOU HAVE A DRINK CONTAINING ALCOHOL: NEVER
HOW MANY STANDARD DRINKS CONTAINING ALCOHOL DO YOU HAVE ON A TYPICAL DAY: PATIENT DOES NOT DRINK

## 2024-07-29 ASSESSMENT — PAIN - FUNCTIONAL ASSESSMENT: PAIN_FUNCTIONAL_ASSESSMENT: 0-10

## 2024-07-30 VITALS
WEIGHT: 220 LBS | HEIGHT: 67 IN | OXYGEN SATURATION: 95 % | SYSTOLIC BLOOD PRESSURE: 136 MMHG | HEART RATE: 88 BPM | DIASTOLIC BLOOD PRESSURE: 91 MMHG | RESPIRATION RATE: 23 BRPM | BODY MASS INDEX: 34.53 KG/M2 | TEMPERATURE: 98.6 F

## 2024-07-30 LAB
EKG P AXIS: 49 DEGREES
EKG P-R INTERVAL: 166 MS
EKG Q-T INTERVAL: 362 MS
EKG QRS DURATION: 84 MS
EKG QTC CALCULATION (BAZETT): 412 MS
EKG T AXIS: 43 DEGREES
TROPONIN, HIGH SENSITIVITY: 7 NG/L (ref 0–14)

## 2024-07-30 PROCEDURE — 84484 ASSAY OF TROPONIN QUANT: CPT

## 2024-07-30 PROCEDURE — 36415 COLL VENOUS BLD VENIPUNCTURE: CPT

## 2024-07-30 ASSESSMENT — HEART SCORE
ECG: NORMAL
ECG: NORMAL

## 2024-07-30 ASSESSMENT — ENCOUNTER SYMPTOMS: SHORTNESS OF BREATH: 0

## 2024-07-30 NOTE — ED PROVIDER NOTES
[facility] EMERGENCY DEPT  eMERGENCY dEPARTMENT eNCOUnter      Pt Name: Angelica Guerra  MRN: [id number]  Birthdate 1975  Date of evaluation: 7/29/2024  Provider: ELISEO Rodriguez    CHIEF COMPLAINT       Chief Complaint   Patient presents with    Chest Pain     Started at 1900 states Anxiety and panic attacks.          HISTORY OF PRESENT ILLNESS   (Location/Symptom, Timing/Onset,Context/Setting, Quality, Duration, Modifying Factors, Severity)  Note limiting factors.   Angelica Guerra is a 48 y.o. female who presents to the emergency department after her usual panic attack.  Patient states she has these frequently and have been worked up for them.  She often has syncopal episodes with them.  This time she also had chest pain with her panic attack.  This does not usually happen.  The pain also went down her left arm.  That caused her anxiety to continue.  Patient also states she has a headache.  She does have a history of migraines and this feels like hr usual starting    The history is provided by the patient.   Chest Pain  Pain location:  Substernal area  Pain radiates to:  L arm  Pain severity:  Moderate  Onset quality:  Sudden  Duration:  2 hours  Timing:  Constant  Progression:  Partially resolved  Chronicity:  New  Associated symptoms: syncope    Associated symptoms: no fever and no shortness of breath    Risk factors: hypertension and obesity    Risk factors: no smoking        NursingNotes were reviewed.    REVIEW OF SYSTEMS    (2-9 systems for level 4, 10 or more for level 5)     Review of Systems   Constitutional:  Negative for fever.   Respiratory:  Negative for shortness of breath.    Cardiovascular:  Positive for chest pain and syncope.   Neurological:  Positive for syncope.   Psychiatric/Behavioral:  The patient is nervous/anxious.        Except as noted above the remainder of the review of systems was reviewed and negative.       PAST MEDICAL HISTORY     Past Medical History:   Diagnosis Date

## 2024-09-06 ENCOUNTER — HOSPITAL ENCOUNTER (OUTPATIENT)
Dept: INFUSION THERAPY | Age: 49
Setting detail: INFUSION SERIES
Discharge: HOME | End: 2024-09-06
Payer: MEDICAID

## 2024-09-06 ENCOUNTER — TELEPHONE (OUTPATIENT)
Dept: NEUROLOGY | Age: 49
End: 2024-09-06

## 2024-09-06 VITALS
SYSTOLIC BLOOD PRESSURE: 134 MMHG | TEMPERATURE: 97.6 F | OXYGEN SATURATION: 98 % | DIASTOLIC BLOOD PRESSURE: 92 MMHG | RESPIRATION RATE: 18 BRPM | HEART RATE: 94 BPM

## 2024-09-06 DIAGNOSIS — G43.909 ACUTE MIGRAINE: Primary | ICD-10-CM

## 2024-09-06 PROCEDURE — 2580000003 HC RX 258: Performed by: NURSE PRACTITIONER

## 2024-09-06 PROCEDURE — 6360000002 HC RX W HCPCS: Performed by: NURSE PRACTITIONER

## 2024-09-06 PROCEDURE — 96375 TX/PRO/DX INJ NEW DRUG ADDON: CPT

## 2024-09-06 PROCEDURE — 96361 HYDRATE IV INFUSION ADD-ON: CPT

## 2024-09-06 PROCEDURE — 96374 THER/PROPH/DIAG INJ IV PUSH: CPT

## 2024-09-06 RX ORDER — HEPARIN 100 UNIT/ML
500 SYRINGE INTRAVENOUS PRN
OUTPATIENT
Start: 2024-09-06

## 2024-09-06 RX ORDER — 0.9 % SODIUM CHLORIDE 0.9 %
500 INTRAVENOUS SOLUTION INTRAVENOUS ONCE
Status: COMPLETED | OUTPATIENT
Start: 2024-09-06 | End: 2024-09-06

## 2024-09-06 RX ORDER — PROMETHAZINE HYDROCHLORIDE 25 MG/ML
25 INJECTION, SOLUTION INTRAMUSCULAR; INTRAVENOUS EVERY 6 HOURS PRN
Status: CANCELLED
Start: 2024-09-06

## 2024-09-06 RX ORDER — KETOROLAC TROMETHAMINE 30 MG/ML
30 INJECTION, SOLUTION INTRAMUSCULAR; INTRAVENOUS ONCE
Status: COMPLETED | OUTPATIENT
Start: 2024-09-06 | End: 2024-09-06

## 2024-09-06 RX ORDER — DEXAMETHASONE SODIUM PHOSPHATE 10 MG/ML
10 INJECTION, SOLUTION INTRAMUSCULAR; INTRAVENOUS ONCE
Status: COMPLETED | OUTPATIENT
Start: 2024-09-06 | End: 2024-09-06

## 2024-09-06 RX ORDER — ALBUTEROL SULFATE 90 UG/1
4 AEROSOL, METERED RESPIRATORY (INHALATION) PRN
OUTPATIENT
Start: 2024-09-06

## 2024-09-06 RX ORDER — ONDANSETRON 2 MG/ML
8 INJECTION INTRAMUSCULAR; INTRAVENOUS
OUTPATIENT
Start: 2024-09-06

## 2024-09-06 RX ORDER — SODIUM CHLORIDE 9 MG/ML
5-250 INJECTION, SOLUTION INTRAVENOUS PRN
OUTPATIENT
Start: 2024-09-06

## 2024-09-06 RX ORDER — EPINEPHRINE 1 MG/ML
0.3 INJECTION, SOLUTION, CONCENTRATE INTRAVENOUS PRN
OUTPATIENT
Start: 2024-09-06

## 2024-09-06 RX ORDER — DIPHENHYDRAMINE HYDROCHLORIDE 50 MG/ML
50 INJECTION INTRAMUSCULAR; INTRAVENOUS ONCE
Status: CANCELLED | OUTPATIENT
Start: 2024-09-06 | End: 2024-09-06

## 2024-09-06 RX ORDER — DIPHENHYDRAMINE HYDROCHLORIDE 50 MG/ML
50 INJECTION INTRAMUSCULAR; INTRAVENOUS
OUTPATIENT
Start: 2024-09-06

## 2024-09-06 RX ORDER — DEXAMETHASONE SODIUM PHOSPHATE 10 MG/ML
10 INJECTION, SOLUTION INTRAMUSCULAR; INTRAVENOUS ONCE
Status: CANCELLED
Start: 2024-09-06 | End: 2024-09-06

## 2024-09-06 RX ORDER — SODIUM CHLORIDE 9 MG/ML
INJECTION, SOLUTION INTRAVENOUS CONTINUOUS
OUTPATIENT
Start: 2024-09-06

## 2024-09-06 RX ORDER — DIPHENHYDRAMINE HYDROCHLORIDE 50 MG/ML
50 INJECTION INTRAMUSCULAR; INTRAVENOUS ONCE
Status: COMPLETED | OUTPATIENT
Start: 2024-09-06 | End: 2024-09-06

## 2024-09-06 RX ORDER — ACETAMINOPHEN 325 MG/1
650 TABLET ORAL
OUTPATIENT
Start: 2024-09-06

## 2024-09-06 RX ORDER — SODIUM CHLORIDE 0.9 % (FLUSH) 0.9 %
5-40 SYRINGE (ML) INJECTION PRN
OUTPATIENT
Start: 2024-09-06

## 2024-09-06 RX ORDER — 0.9 % SODIUM CHLORIDE 0.9 %
500 INTRAVENOUS SOLUTION INTRAVENOUS ONCE
Status: CANCELLED | OUTPATIENT
Start: 2024-09-06 | End: 2024-09-06

## 2024-09-06 RX ORDER — PROMETHAZINE HYDROCHLORIDE 25 MG/ML
25 INJECTION, SOLUTION INTRAMUSCULAR; INTRAVENOUS EVERY 6 HOURS PRN
Status: DISCONTINUED | OUTPATIENT
Start: 2024-09-06 | End: 2024-09-06 | Stop reason: SDUPTHER

## 2024-09-06 RX ORDER — 0.9 % SODIUM CHLORIDE 0.9 %
500 INTRAVENOUS SOLUTION INTRAVENOUS ONCE
OUTPATIENT
Start: 2024-09-06 | End: 2024-09-06

## 2024-09-06 RX ADMIN — DEXAMETHASONE SODIUM PHOSPHATE 10 MG: 10 INJECTION, SOLUTION INTRAMUSCULAR; INTRAVENOUS at 14:34

## 2024-09-06 RX ADMIN — DIPHENHYDRAMINE HYDROCHLORIDE 50 MG: 50 INJECTION INTRAMUSCULAR; INTRAVENOUS at 14:34

## 2024-09-06 RX ADMIN — PROMETHAZINE HYDROCHLORIDE 25 MG: 25 INJECTION INTRAMUSCULAR; INTRAVENOUS at 14:55

## 2024-09-06 RX ADMIN — KETOROLAC TROMETHAMINE 30 MG: 30 INJECTION, SOLUTION INTRAMUSCULAR at 14:33

## 2024-09-06 RX ADMIN — SODIUM CHLORIDE 500 ML: 9 INJECTION, SOLUTION INTRAVENOUS at 14:22

## 2024-09-06 NOTE — TELEPHONE ENCOUNTER
Patient was told called if she  had a bad headache and medication not working and she needs a cocktail for headaches . Please called patient.      Thank you

## 2024-09-06 NOTE — PROGRESS NOTES
Pt arrived to [facility] and PIV placed. Pt received migraine cocktail  per MD orders. Pt tolerated well.  present and signed discharge papers.    Electronically signed by Niecy Hoffman RN on 9/6/2024 at 3:40 PM

## 2024-09-18 ENCOUNTER — HOSPITAL ENCOUNTER (OUTPATIENT)
Dept: INFUSION THERAPY | Age: 49
Setting detail: INFUSION SERIES
Discharge: HOME | End: 2024-09-18
Payer: MEDICAID

## 2024-09-18 VITALS
SYSTOLIC BLOOD PRESSURE: 134 MMHG | OXYGEN SATURATION: 99 % | RESPIRATION RATE: 18 BRPM | TEMPERATURE: 97.7 F | HEART RATE: 98 BPM | DIASTOLIC BLOOD PRESSURE: 87 MMHG

## 2024-09-18 DIAGNOSIS — G43.109 MIGRAINE WITH AURA AND WITHOUT STATUS MIGRAINOSUS, NOT INTRACTABLE: Primary | ICD-10-CM

## 2024-09-18 PROCEDURE — 6360000002 HC RX W HCPCS: Performed by: NURSE PRACTITIONER

## 2024-09-18 PROCEDURE — 2580000003 HC RX 258: Performed by: NURSE PRACTITIONER

## 2024-09-18 PROCEDURE — 96365 THER/PROPH/DIAG IV INF INIT: CPT

## 2024-09-18 RX ORDER — ALBUTEROL SULFATE 90 UG/1
4 INHALANT RESPIRATORY (INHALATION) PRN
OUTPATIENT
Start: 2024-12-11

## 2024-09-18 RX ORDER — EPINEPHRINE 1 MG/ML
0.3 INJECTION, SOLUTION, CONCENTRATE INTRAVENOUS PRN
OUTPATIENT
Start: 2024-12-11

## 2024-09-18 RX ORDER — SODIUM CHLORIDE 0.9 % (FLUSH) 0.9 %
5-40 SYRINGE (ML) INJECTION PRN
OUTPATIENT
Start: 2024-12-11

## 2024-09-18 RX ORDER — SODIUM CHLORIDE 9 MG/ML
5-250 INJECTION, SOLUTION INTRAVENOUS PRN
OUTPATIENT
Start: 2024-12-11

## 2024-09-18 RX ORDER — ONDANSETRON 2 MG/ML
8 INJECTION INTRAMUSCULAR; INTRAVENOUS
OUTPATIENT
Start: 2024-12-11

## 2024-09-18 RX ORDER — SODIUM CHLORIDE 9 MG/ML
5-250 INJECTION, SOLUTION INTRAVENOUS PRN
Status: DISCONTINUED | OUTPATIENT
Start: 2024-09-18 | End: 2024-09-19 | Stop reason: HOSPADM

## 2024-09-18 RX ORDER — DIPHENHYDRAMINE HYDROCHLORIDE 50 MG/ML
50 INJECTION INTRAMUSCULAR; INTRAVENOUS
OUTPATIENT
Start: 2024-12-11

## 2024-09-18 RX ORDER — ACETAMINOPHEN 325 MG/1
650 TABLET ORAL
OUTPATIENT
Start: 2024-12-11

## 2024-09-18 RX ORDER — SODIUM CHLORIDE 0.9 % (FLUSH) 0.9 %
5-40 SYRINGE (ML) INJECTION PRN
Status: DISCONTINUED | OUTPATIENT
Start: 2024-09-18 | End: 2024-09-19 | Stop reason: HOSPADM

## 2024-09-18 RX ORDER — SODIUM CHLORIDE 9 MG/ML
INJECTION, SOLUTION INTRAVENOUS CONTINUOUS
OUTPATIENT
Start: 2024-12-11

## 2024-09-18 RX ADMIN — SODIUM CHLORIDE 20 ML/HR: 9 INJECTION, SOLUTION INTRAVENOUS at 14:23

## 2024-09-18 RX ADMIN — EPTINEZUMAB-JJMR 300 MG: 100 INJECTION INTRAVENOUS at 14:23

## 2024-09-23 ENCOUNTER — HOSPITAL ENCOUNTER (OUTPATIENT)
Dept: WOMENS IMAGING | Age: 49
Discharge: HOME | End: 2024-09-23
Payer: MEDICAID

## 2024-09-23 VITALS — BODY MASS INDEX: 32.96 KG/M2 | WEIGHT: 210 LBS | HEIGHT: 67 IN

## 2024-09-23 DIAGNOSIS — Z12.31 ENCOUNTER FOR SCREENING MAMMOGRAM FOR MALIGNANT NEOPLASM OF BREAST: ICD-10-CM

## 2024-09-23 PROCEDURE — 77063 BREAST TOMOSYNTHESIS BI: CPT

## 2024-10-09 ENCOUNTER — ANCILLARY PROCEDURE (OUTPATIENT)
Dept: PRIMARY CARE CLINIC | Age: 49
End: 2024-10-09

## 2024-10-09 ENCOUNTER — OFFICE VISIT (OUTPATIENT)
Dept: PRIMARY CARE CLINIC | Age: 49
End: 2024-10-09

## 2024-10-09 VITALS
DIASTOLIC BLOOD PRESSURE: 80 MMHG | HEART RATE: 109 BPM | OXYGEN SATURATION: 97 % | TEMPERATURE: 97.5 F | WEIGHT: 233.6 LBS | BODY MASS INDEX: 36.59 KG/M2 | SYSTOLIC BLOOD PRESSURE: 116 MMHG

## 2024-10-09 DIAGNOSIS — M25.442 FINGER JOINT SWELLING, LEFT: ICD-10-CM
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
## 2024-10-09 DIAGNOSIS — M54.50 ACUTE BILATERAL LOW BACK PAIN WITHOUT SCIATICA: ICD-10-CM

## 2024-10-09 DIAGNOSIS — R10.2 PELVIC PRESSURE IN FEMALE: ICD-10-CM

## 2024-10-09 DIAGNOSIS — M54.50 LUMBAR BACK PAIN: ICD-10-CM

## 2024-10-09 LAB
APPEARANCE FLUID: CLEAR
BILIRUBIN, POC: ABNORMAL
BLOOD URINE, POC: ABNORMAL
CLARITY, POC: CLEAR
COLOR, POC: ABNORMAL
GLUCOSE URINE, POC: ABNORMAL MG/DL
KETONES, POC: 15 MG/DL
LEUKOCYTE EST, POC: ABNORMAL
NITRITE, POC: POSITIVE
PH, POC: 6
PROTEIN, POC: ABNORMAL MG/DL
SPECIFIC GRAVITY, POC: >=1.03
UROBILINOGEN, POC: 0.2 MG/DL

## 2024-10-09 RX ORDER — TRIAMCINOLONE ACETONIDE 40 MG/ML
40 INJECTION, SUSPENSION INTRA-ARTICULAR; INTRAMUSCULAR ONCE
Status: COMPLETED | OUTPATIENT
Start: 2024-10-09 | End: 2024-10-09

## 2024-10-09 RX ORDER — FLUCONAZOLE 150 MG/1
TABLET ORAL
Qty: 2 TABLET | Refills: 0 | Status: SHIPPED | OUTPATIENT
Start: 2024-10-09

## 2024-10-09 RX ORDER — SULFAMETHOXAZOLE/TRIMETHOPRIM 800-160 MG
1 TABLET ORAL 2 TIMES DAILY
Qty: 14 TABLET | Refills: 0 | Status: SHIPPED | OUTPATIENT
Start: 2024-10-09 | End: 2024-10-16

## 2024-10-09 RX ORDER — VENLAFAXINE HYDROCHLORIDE 75 MG/1
CAPSULE, EXTENDED RELEASE ORAL DAILY
COMMUNITY
Start: 2024-08-05

## 2024-10-09 RX ORDER — KETOROLAC TROMETHAMINE 10 MG/1
10 TABLET, FILM COATED ORAL EVERY 6 HOURS PRN
Qty: 20 TABLET | Refills: 0 | Status: SHIPPED | OUTPATIENT
Start: 2024-10-09 | End: 2025-10-09

## 2024-10-09 RX ADMIN — TRIAMCINOLONE ACETONIDE 40 MG: 40 INJECTION, SUSPENSION INTRA-ARTICULAR; INTRAMUSCULAR at 14:27

## 2024-10-09 ASSESSMENT — ENCOUNTER SYMPTOMS: BACK PAIN: 1

## 2024-10-09 NOTE — PATIENT INSTRUCTIONS
Start antibiotics for infection  Will forward culture to urology  Wear splint and elevate finger x 2 weeks.   F/u with dr tang in 2 weeks  Toradol as needed for back pain   Tizandidine muscle relaxer  Kenalog today since you dont want steroids  Can still do motrin for back , warm heat  and stretch.

## 2024-10-10 NOTE — TELEPHONE ENCOUNTER
Angelica Guerra called to request a refill on her medication.      Last office visit : 10/9/2024   Next office visit : 10/23/2024     Requested Prescriptions     Pending Prescriptions Disp Refills    omeprazole (PRILOSEC) 20 MG delayed release capsule [Pharmacy Med Name: OMEPRAZOLE DR 20 MG CAPSULE] 30 capsule 3     Sig: TAKE 1 CAPSULE BY MOUTH EVERY DAY            Sharita Lanza MA

## 2024-10-11 LAB
BACTERIA UR CULT: ABNORMAL
BACTERIA UR CULT: ABNORMAL
ORGANISM: ABNORMAL

## 2024-10-23 ENCOUNTER — ANCILLARY PROCEDURE (OUTPATIENT)
Dept: PRIMARY CARE CLINIC | Age: 49
End: 2024-10-23

## 2024-10-23 ENCOUNTER — OFFICE VISIT (OUTPATIENT)
Dept: PRIMARY CARE CLINIC | Age: 49
End: 2024-10-23
Payer: MEDICAID

## 2024-10-23 VITALS
WEIGHT: 237.2 LBS | HEART RATE: 103 BPM | HEIGHT: 67 IN | OXYGEN SATURATION: 98 % | RESPIRATION RATE: 16 BRPM | BODY MASS INDEX: 37.23 KG/M2 | SYSTOLIC BLOOD PRESSURE: 138 MMHG | TEMPERATURE: 96.8 F | DIASTOLIC BLOOD PRESSURE: 86 MMHG

## 2024-10-23 DIAGNOSIS — M53.3 TAIL BONE PAIN: Primary | ICD-10-CM
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
## 2024-10-23 PROCEDURE — 99213 OFFICE O/P EST LOW 20 MIN: CPT | Performed by: FAMILY MEDICINE

## 2024-10-23 RX ORDER — TRAMADOL HYDROCHLORIDE 50 MG/1
50 TABLET ORAL EVERY 6 HOURS PRN
Qty: 28 TABLET | Refills: 0 | Status: SHIPPED | OUTPATIENT
Start: 2024-10-23 | End: 2024-10-30

## 2024-10-23 ASSESSMENT — ENCOUNTER SYMPTOMS
NAUSEA: 0
COUGH: 0
DIARRHEA: 0
BACK PAIN: 1
VOMITING: 0
EYE DISCHARGE: 0
COLOR CHANGE: 0
WHEEZING: 0
ABDOMINAL PAIN: 0

## 2024-10-23 NOTE — PROGRESS NOTES
SUBJECTIVE:    Patient ID: Angelica Guerra is a 48 y.o. female.    HPI:   Patient is seen today for complaints of sacral and coccyx pain.  She states that she is using a doughnut and using Toradol but she states that she is having difficulty getting comfortable because of the pain in her buttocks and lower spine area.  She was seen last week after the fall and was told that she had a broken finger.  She states that she has not gotten the results back yet but they looked at them while she was here.  She states that she feels like she needs something to help her get comfortable because of the pain in her lower buttocks area more than the Toradol.  She states that she is having the most difficulty at night with sleep    Past Medical History:   Diagnosis Date    Borderline diabetes     Brain aneurysm     GERD (gastroesophageal reflux disease)     Migraines     Overactive bladder     Panic attack as reaction to stress     Primary hypertension       Current Outpatient Medications on File Prior to Visit   Medication Sig Dispense Refill    omeprazole (PRILOSEC) 20 MG delayed release capsule TAKE 1 CAPSULE BY MOUTH EVERY DAY 30 capsule 3    venlafaxine (EFFEXOR XR) 75 MG extended release capsule Take by mouth daily      fluconazole (DIFLUCAN) 150 MG tablet Take one now and repeat in 1 wk if needed 2 tablet 0    ketorolac (TORADOL) 10 MG tablet Take 1 tablet by mouth every 6 hours as needed for Pain 20 tablet 0    metFORMIN (GLUCOPHAGE) 500 MG tablet TAKE 1 TABLET BY MOUTH IN THE MORNING THEN TAKE 1 TABLET BY MOUTH IN THE EVENING WITH MEALS 60 tablet 3    traZODone (DESYREL) 50 MG tablet Take 1 tablet by mouth nightly 30 tablet 5    ondansetron (ZOFRAN-ODT) 4 MG disintegrating tablet DISSOLVE 1 TABLET ON THE TONGUE THREE TIMES DAILY AS NEEDED FOR NAUSEA OR VOMITING 21 tablet 3    tiZANidine (ZANAFLEX) 4 MG tablet TAKE 1 TABLET BY MOUTH EVERY NIGHT AS NEEDED FOR FLANK PAIN 30 tablet 5    lisinopril (PRINIVIL;ZESTRIL) 5 MG

## 2024-10-28 ENCOUNTER — OFFICE VISIT (OUTPATIENT)
Dept: NEUROLOGY | Age: 49
End: 2024-10-28
Payer: MEDICAID

## 2024-10-28 VITALS
OXYGEN SATURATION: 100 % | BODY MASS INDEX: 37.2 KG/M2 | SYSTOLIC BLOOD PRESSURE: 140 MMHG | HEART RATE: 91 BPM | WEIGHT: 237 LBS | DIASTOLIC BLOOD PRESSURE: 100 MMHG | HEIGHT: 67 IN

## 2024-10-28 DIAGNOSIS — G43.109 MIGRAINE WITH AURA AND WITHOUT STATUS MIGRAINOSUS, NOT INTRACTABLE: Primary | ICD-10-CM

## 2024-10-28 PROCEDURE — 99213 OFFICE O/P EST LOW 20 MIN: CPT | Performed by: NURSE PRACTITIONER

## 2024-10-28 NOTE — PROGRESS NOTES
REVIEW OF SYSTEMS    Constitutional: []Fever []Sweats []Chills [] Recent Injury [x] Denies all unless marked  HEENT:[]Headache  [] Head Injury [] Hearing Loss  [] Sore Throat  [] Ear Ache [x] Denies all unless marked  Spine:  [] Neck pain  [] Back pain  [] Sciaticia  [x] Denies all unless marked  Cardiovascular:[]Heart Disease []Palpitations [] Chest Pain   [x] Denies all unless marked  Pulmonary: []Shortness of Breath []Cough   [x] Denies all unless marked  Psychiatric/Behavioral:[] Depression [] Anxiety [x] Denies all unless marked  Gastrointestinal: []Nausea  []Vomiting  []Abdominal Pain  []Constipation  []Diarrhea  [x] Denies all unless marked  Genitourinary:   [] Frequency  [] Urgency  [] Dysuria [] Incontinence  [x] Denies all unless marked  Extremities: []Pain  []Swelling  [x] Denies all unless marked  Musculoskeletal: [] Myalgias  [] Joint Pain  [] Arthritis [] Muscle Cramps [] Muscle Twitches  [x] Denies all unless marked  Sleep: []Insomnia[]Snoring []Restless Legs  []Sleep Apnea  []Daytime Sleepiness  [x] Denies all unless marked  Skin:[] Rash [] Color Change [x] Denies all unless marked   Neurological:[]Visual Disturbance [] Memory Loss []Loss of Balance []Slurred Speech []Weakness []Seizures  [] Dizziness [x] Denies all unless marked    
Denies all unless marked  Sleep: []Insomnia[]Snoring []Restless Legs  []Sleep Apnea  []Daytime Sleepiness  [x] Denies all unless marked  Skin:[] Rash [] Color Change [x] Denies all unless marked   Neurological:[]Visual Disturbance [] Memory Loss []Loss of Balance []Slurred Speech []Weakness []Seizures  [] Dizziness [x] Denies all unless marked    The MA has completed the ROS with the patient. I have reviewed it in its' entirety with the patient and agree with the documentation.     PHYSICAL EXAM  BP (!) 140/100 Comment: mannual bp  Pulse 91   Ht 1.702 m (5' 7\")   Wt 107.5 kg (237 lb)   SpO2 100%   BMI 37.12 kg/m²       Constitutional - No acute distress    HEENT- Conjunctiva normal.  No scars, masses, or lesions over external nose or ears, no neck masses noted, no jugular vein distension, no bruit  Cardiac- Regular rate and rhythm  Pulmonary- Good expansion, normal effort without use of accessory muscles  Musculoskeletal - No significant wasting of muscles noted, no bony deformities  Extremities - No clubbing, cyanosis or edema  Skin - Warm, dry, and intact.  No rash, erythema, or pallor  Psychiatric - Mood, affect, and behavior appear normal      NEUROLOGICAL EXAM     Mental status   [x] Awake, alert, oriented   [x]Affect attention and concentration appear appropriate  [x]Recent and remote memory appears unremarkable  [x]Speech normal without dysarthria or aphasia, comprehension and repetition intact.   COMMENTS:    Cranial Nerves [x]No VF deficit to confrontation,  no papilledema on fundoscopic exam.  [x]PERRLA, EOMI, no nystagmus, conjugate eye movements, no ptosis  [x]Face symmetric  [x]Facial sensation intact  [x]Tongue midline no atrophy or fasciculations present  [x]Palate midline, hearing to finger rub normal bilaterally  [x]Shoulder shrug and SCM testing normal bilaterally  COMMENTS:   Motor   [x]5/5 strength x 4 extremities  [x]Normal bulk and tone  [x]No tremor present  [x]No rigidity or bradykinesia

## 2024-11-01 ENCOUNTER — PATIENT MESSAGE (OUTPATIENT)
Dept: PRIMARY CARE CLINIC | Age: 49
End: 2024-11-01

## 2024-11-11 ENCOUNTER — PATIENT MESSAGE (OUTPATIENT)
Dept: PRIMARY CARE CLINIC | Age: 49
End: 2024-11-11

## 2024-11-11 DIAGNOSIS — M53.3 TAIL BONE PAIN: Primary | ICD-10-CM
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
## 2024-11-15 ENCOUNTER — OFFICE VISIT (OUTPATIENT)
Dept: PRIMARY CARE CLINIC | Age: 49
End: 2024-11-15
Payer: MEDICAID

## 2024-11-15 VITALS
HEIGHT: 67 IN | HEART RATE: 126 BPM | BODY MASS INDEX: 36.57 KG/M2 | TEMPERATURE: 97.9 F | RESPIRATION RATE: 18 BRPM | SYSTOLIC BLOOD PRESSURE: 134 MMHG | OXYGEN SATURATION: 100 % | DIASTOLIC BLOOD PRESSURE: 86 MMHG | WEIGHT: 233 LBS

## 2024-11-15 DIAGNOSIS — F41.9 ANXIETY: ICD-10-CM

## 2024-11-15 DIAGNOSIS — M54.50 LUMBAR BACK PAIN: Primary | ICD-10-CM

## 2024-11-15 DIAGNOSIS — M53.3 TAIL BONE PAIN: ICD-10-CM

## 2024-11-15 LAB
ALBUMIN SERPL-MCNC: 4.9 G/DL (ref 3.5–5.2)
ALP SERPL-CCNC: 148 U/L (ref 35–104)
ALT SERPL-CCNC: 14 U/L (ref 5–33)
ANION GAP SERPL CALCULATED.3IONS-SCNC: 13 MMOL/L (ref 7–19)
AST SERPL-CCNC: 18 U/L (ref 5–32)
BASOPHILS # BLD: 0.1 K/UL (ref 0–0.2)
BASOPHILS NFR BLD: 0.8 % (ref 0–1)
BILIRUB SERPL-MCNC: 0.3 MG/DL (ref 0.2–1.2)
BUN SERPL-MCNC: 25 MG/DL (ref 6–20)
CALCIUM SERPL-MCNC: 9.4 MG/DL (ref 8.6–10)
CHLORIDE SERPL-SCNC: 100 MMOL/L (ref 98–111)
CO2 SERPL-SCNC: 27 MMOL/L (ref 22–29)
CREAT SERPL-MCNC: 1.3 MG/DL (ref 0.5–0.9)
EOSINOPHIL # BLD: 0 K/UL (ref 0–0.6)
EOSINOPHIL NFR BLD: 0.1 % (ref 0–5)
ERYTHROCYTE [DISTWIDTH] IN BLOOD BY AUTOMATED COUNT: 12.3 % (ref 11.5–14.5)
GLUCOSE SERPL-MCNC: 119 MG/DL (ref 70–99)
HCT VFR BLD AUTO: 44.1 % (ref 37–47)
HGB BLD-MCNC: 14.9 G/DL (ref 12–16)
IMM GRANULOCYTES # BLD: 0 K/UL
LYMPHOCYTES # BLD: 1.6 K/UL (ref 1.1–4.5)
LYMPHOCYTES NFR BLD: 18.7 % (ref 20–40)
MCH RBC QN AUTO: 30.4 PG (ref 27–31)
MCHC RBC AUTO-ENTMCNC: 33.8 G/DL (ref 33–37)
MCV RBC AUTO: 90 FL (ref 81–99)
MONOCYTES # BLD: 0.7 K/UL (ref 0–0.9)
MONOCYTES NFR BLD: 8.5 % (ref 0–10)
NEUTROPHILS # BLD: 5.9 K/UL (ref 1.5–7.5)
NEUTS SEG NFR BLD: 71.7 % (ref 50–65)
PLATELET # BLD AUTO: 347 K/UL (ref 130–400)
PMV BLD AUTO: 9 FL (ref 9.4–12.3)
POTASSIUM SERPL-SCNC: 4.6 MMOL/L (ref 3.5–5)
PROT SERPL-MCNC: 7.7 G/DL (ref 6.4–8.3)
RBC # BLD AUTO: 4.9 M/UL (ref 4.2–5.4)
SODIUM SERPL-SCNC: 140 MMOL/L (ref 136–145)
WBC # BLD AUTO: 8.3 K/UL (ref 4.8–10.8)

## 2024-11-15 PROCEDURE — 96372 THER/PROPH/DIAG INJ SC/IM: CPT | Performed by: NURSE PRACTITIONER

## 2024-11-15 PROCEDURE — 99213 OFFICE O/P EST LOW 20 MIN: CPT | Performed by: NURSE PRACTITIONER

## 2024-11-15 RX ORDER — KETOROLAC TROMETHAMINE 10 MG/1
10 TABLET, FILM COATED ORAL EVERY 6 HOURS PRN
Qty: 20 TABLET | Refills: 0 | Status: SHIPPED | OUTPATIENT
Start: 2024-11-15 | End: 2025-11-15

## 2024-11-15 RX ORDER — KETOROLAC TROMETHAMINE 30 MG/ML
60 INJECTION, SOLUTION INTRAMUSCULAR; INTRAVENOUS ONCE
Status: COMPLETED | OUTPATIENT
Start: 2024-11-15 | End: 2024-11-15

## 2024-11-15 RX ADMIN — KETOROLAC TROMETHAMINE 60 MG: 30 INJECTION, SOLUTION INTRAMUSCULAR; INTRAVENOUS at 15:41

## 2024-11-15 NOTE — PROGRESS NOTES
As per orders of ELISEO Laguna, injection of Toradol 60 mg was given in Dorsogluteal Right by Mireya Duckworth CMA. Patient tolerated well. Advised to take tylenol/ibuprofen for soreness and fever. Understanding was voiced.    
capsule by mouth daily       No current facility-administered medications on file prior to visit.     Allergies   Allergen Reactions    Plavix [Clopidogrel Bisulfate] Hives    Nasonex [Mometasone] Nausea And Vomiting       Health Maintenance   Topic Date Due    Hepatitis C screen  Never done    DTaP/Tdap/Td vaccine (1 - Tdap) Never done    Cervical cancer screen  Never done    COVID-19 Vaccine (3 - 2023-24 season) 09/01/2024    Shingles vaccine (1 of 2) Never done    Hepatitis B vaccine (1 of 3 - 19+ 3-dose series) 02/27/2025 (Originally 11/14/1993)    Flu vaccine (1) 11/15/2025 (Originally 8/1/2024)    Depression Screen  07/25/2025    GFR test (Diabetes, CKD 3-4, OR last GFR 15-59)  11/15/2025    Colorectal Cancer Screen  11/18/2025    Breast cancer screen  09/23/2026    Diabetes screen  01/22/2027    Lipids  01/22/2029    HIV screen  Completed    Hepatitis A vaccine  Aged Out    Hib vaccine  Aged Out    Polio vaccine  Aged Out    Meningococcal (ACWY) vaccine  Aged Out    Pneumococcal 0-64 years Vaccine  Aged Out       Subjective:      Review of Systems   Constitutional:  Positive for chills and diaphoresis.   HENT: Negative.     Eyes: Negative.    Respiratory: Negative.  Negative for cough and shortness of breath.    Cardiovascular: Negative.  Negative for chest pain.   Gastrointestinal: Negative.  Negative for abdominal distention, abdominal pain, constipation, diarrhea, nausea and vomiting.   Endocrine: Negative.    Genitourinary:  Negative for difficulty urinating, dysuria and frequency.   Musculoskeletal:  Positive for arthralgias, back pain and myalgias.   Skin:  Positive for color change.   Allergic/Immunologic: Negative.    Neurological: Negative.  Negative for dizziness, light-headedness and headaches.   Hematological: Negative.    Psychiatric/Behavioral:  The patient is nervous/anxious.        Objective:     Physical Exam  Vitals and nursing note reviewed.   Constitutional:       General: She is not in

## 2024-11-15 NOTE — PATIENT INSTRUCTIONS
CALL FAMILY TO COME AND PICK YOU UP.  DO NOT DRIVE UNTIL YOU RECOVER FROM KLONOPIN AND EMINENT PANIC ATTACK         Sprain/strain   First 24 hours, use ice to injury site for 15 minutes at a time.  After 48 hours, may alternate ice/heat 15 minutes each.  R.I. C.E. therapy = rest, ice, compression and elevation.  May use ace wrap to injured area for compression.  Use Ibuprofen or other antiinflammatory for pain and inflammation.   If no open skin areas, OTC topical lidocaine such as Icy Hot or capsaicin creams may be applied for pain relief.  Slow stretches of area may help reduce spasms and improve range of motion.   Alternate Voltaren cream with Biofreeze gel to site 2 to 3 times a day .

## 2024-11-18 ENCOUNTER — HOSPITAL ENCOUNTER (OUTPATIENT)
Dept: MRI IMAGING | Age: 49
Discharge: HOME | End: 2024-11-18
Payer: MEDICAID
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
## 2024-11-18 DIAGNOSIS — N17.9 ACUTE KIDNEY INJURY (HCC): Primary | ICD-10-CM

## 2024-11-18 DIAGNOSIS — M53.3 TAIL BONE PAIN: ICD-10-CM

## 2024-11-18 PROCEDURE — 72195 MRI PELVIS W/O DYE: CPT

## 2024-11-19 ENCOUNTER — OFFICE VISIT (OUTPATIENT)
Dept: UROLOGY | Age: 49
End: 2024-11-19
Payer: MEDICAID

## 2024-11-19 VITALS — BODY MASS INDEX: 37.67 KG/M2 | WEIGHT: 240 LBS | HEIGHT: 67 IN | TEMPERATURE: 97.4 F

## 2024-11-19 DIAGNOSIS — N39.0 RECURRENT UTI: ICD-10-CM

## 2024-11-19 DIAGNOSIS — N32.81 OAB (OVERACTIVE BLADDER): Primary | ICD-10-CM

## 2024-11-19 DIAGNOSIS — N39.46 MIXED INCONTINENCE URGE AND STRESS: ICD-10-CM

## 2024-11-19 DIAGNOSIS — N39.44 NOCTURNAL ENURESIS: ICD-10-CM

## 2024-11-19 LAB
APPEARANCE FLUID: NORMAL
BILIRUBIN, POC: NORMAL
BLOOD URINE, POC: NORMAL
CLARITY, POC: NORMAL
COLOR, POC: NORMAL
GLUCOSE URINE, POC: NORMAL MG/DL
KETONES, POC: NORMAL MG/DL
LEUKOCYTE EST, POC: NORMAL
NITRITE, POC: NORMAL
PH, POC: 5.5
PROTEIN, POC: NORMAL MG/DL
SPECIFIC GRAVITY, POC: 1.02
UROBILINOGEN, POC: 0.2 MG/DL

## 2024-11-19 PROCEDURE — 51798 US URINE CAPACITY MEASURE: CPT | Performed by: NURSE PRACTITIONER

## 2024-11-19 PROCEDURE — 81002 URINALYSIS NONAUTO W/O SCOPE: CPT | Performed by: NURSE PRACTITIONER

## 2024-11-19 PROCEDURE — 99214 OFFICE O/P EST MOD 30 MIN: CPT | Performed by: NURSE PRACTITIONER

## 2024-11-19 NOTE — PROGRESS NOTES
Angelica Guerra is a 49 y.o. female who presents today   Chief Complaint   Patient presents with    Follow-up     Bladder scan       Patient is a 49-year-old female who presents clinic today for a bladder scan.  She has had a abrupt drop in her kidney function.  She had incomplete emptying previously with anticholinergics (Vesicare 5 mg) which have been discontinued.  She is currently maintained on Myrbetriq 50 mg daily.  She continues to have worsening incontinence and enuresis.  She was having some difficulty urinating therefore I wanted her to follow-up here for a bladder scan.  I did place her on vaginal estrogen 3 times weekly for recurrent UTIs she is doing well has not had 1 since February.    Urinary Tract Infection:  Patient is here today for evaluation of recurrent uti.   The patient has had 3 infections the past year.  During the infections, the patient has experienced the following symptoms: dysuria, frequency, urgency  Fever and chills? no fever or chills  Previous urine C&S results:   2/27/2024E. Coli  1/22/2024E. Coli  7/19/2023Enterobacter     Previous treatments tried for UTI's: antibiotics   Symptoms improve with treatment: yes     Had a pelvic exam at her PCP office revealing vaginal dryness mild cystocele.  She also has complaints of nocturnal enuresis she does take Klonopin and trazodone for sleep.  Even with cutting off her fluids prior to bedtime she continues to have enuresis.  She has had 2 increase her fluid intake due to her kidney function no history of stones, denies any flank pain.      Past Medical History:   Diagnosis Date    Borderline diabetes     Brain aneurysm     GERD (gastroesophageal reflux disease)     Migraines     Overactive bladder     Panic attack as reaction to stress     Primary hypertension        No past surgical history on file.    Current Outpatient Medications   Medication Sig Dispense Refill    ketorolac (TORADOL) 10 MG tablet Take 1 tablet by mouth every 6 hours

## 2024-11-20 ASSESSMENT — ENCOUNTER SYMPTOMS
VOMITING: 0
NAUSEA: 0
BACK PAIN: 0
ABDOMINAL DISTENTION: 0
ABDOMINAL PAIN: 0

## 2024-12-02 DIAGNOSIS — N17.9 ACUTE KIDNEY INJURY (HCC): ICD-10-CM

## 2024-12-02 LAB
ANION GAP SERPL CALCULATED.3IONS-SCNC: 12 MMOL/L (ref 7–19)
BUN SERPL-MCNC: 18 MG/DL (ref 6–20)
CALCIUM SERPL-MCNC: 9 MG/DL (ref 8.6–10)
CHLORIDE SERPL-SCNC: 104 MMOL/L (ref 98–111)
CO2 SERPL-SCNC: 25 MMOL/L (ref 22–29)
CREAT SERPL-MCNC: 1 MG/DL (ref 0.5–0.9)
GLUCOSE SERPL-MCNC: 119 MG/DL (ref 70–99)
POTASSIUM SERPL-SCNC: 4 MMOL/L (ref 3.5–5)
SODIUM SERPL-SCNC: 141 MMOL/L (ref 136–145)

## 2024-12-18 ENCOUNTER — HOSPITAL ENCOUNTER (OUTPATIENT)
Dept: INFUSION THERAPY | Age: 49
Setting detail: INFUSION SERIES
Discharge: HOME | End: 2024-12-18
Payer: MEDICAID

## 2024-12-18 VITALS
HEART RATE: 97 BPM | OXYGEN SATURATION: 100 % | RESPIRATION RATE: 18 BRPM | DIASTOLIC BLOOD PRESSURE: 80 MMHG | SYSTOLIC BLOOD PRESSURE: 139 MMHG | TEMPERATURE: 96.8 F

## 2024-12-18 DIAGNOSIS — G43.109 MIGRAINE WITH AURA AND WITHOUT STATUS MIGRAINOSUS, NOT INTRACTABLE: Primary | ICD-10-CM

## 2024-12-18 PROCEDURE — 2580000003 HC RX 258: Performed by: NURSE PRACTITIONER

## 2024-12-18 PROCEDURE — 6360000002 HC RX W HCPCS: Performed by: NURSE PRACTITIONER

## 2024-12-18 PROCEDURE — 96365 THER/PROPH/DIAG IV INF INIT: CPT

## 2024-12-18 PROCEDURE — 2500000003 HC RX 250 WO HCPCS: Performed by: NURSE PRACTITIONER

## 2024-12-18 RX ORDER — HYDROCORTISONE SODIUM SUCCINATE 100 MG/2ML
100 INJECTION INTRAMUSCULAR; INTRAVENOUS
OUTPATIENT
Start: 2025-03-05

## 2024-12-18 RX ORDER — SODIUM CHLORIDE 9 MG/ML
INJECTION, SOLUTION INTRAVENOUS CONTINUOUS
OUTPATIENT
Start: 2025-03-05

## 2024-12-18 RX ORDER — ALBUTEROL SULFATE 90 UG/1
4 INHALANT RESPIRATORY (INHALATION) PRN
OUTPATIENT
Start: 2025-03-05

## 2024-12-18 RX ORDER — EPINEPHRINE 1 MG/ML
0.3 INJECTION, SOLUTION, CONCENTRATE INTRAVENOUS PRN
OUTPATIENT
Start: 2025-03-05

## 2024-12-18 RX ORDER — ONDANSETRON 2 MG/ML
8 INJECTION INTRAMUSCULAR; INTRAVENOUS
OUTPATIENT
Start: 2025-03-05

## 2024-12-18 RX ORDER — SODIUM CHLORIDE 0.9 % (FLUSH) 0.9 %
5-40 SYRINGE (ML) INJECTION PRN
OUTPATIENT
Start: 2025-03-05

## 2024-12-18 RX ORDER — SODIUM CHLORIDE 9 MG/ML
5-250 INJECTION, SOLUTION INTRAVENOUS PRN
OUTPATIENT
Start: 2025-03-05

## 2024-12-18 RX ORDER — ACETAMINOPHEN 325 MG/1
650 TABLET ORAL
OUTPATIENT
Start: 2025-03-05

## 2024-12-18 RX ORDER — DIPHENHYDRAMINE HYDROCHLORIDE 50 MG/ML
50 INJECTION INTRAMUSCULAR; INTRAVENOUS
OUTPATIENT
Start: 2025-03-05

## 2024-12-18 RX ORDER — SODIUM CHLORIDE 0.9 % (FLUSH) 0.9 %
5-40 SYRINGE (ML) INJECTION PRN
Status: DISCONTINUED | OUTPATIENT
Start: 2024-12-18 | End: 2024-12-19 | Stop reason: HOSPADM

## 2024-12-18 RX ADMIN — SODIUM CHLORIDE, PRESERVATIVE FREE 10 ML: 5 INJECTION INTRAVENOUS at 14:55

## 2024-12-18 RX ADMIN — EPTINEZUMAB-JJMR 300 MG: 100 INJECTION INTRAVENOUS at 14:23

## 2024-12-22 DIAGNOSIS — N32.81 OAB (OVERACTIVE BLADDER): ICD-10-CM

## 2024-12-22 DIAGNOSIS — N39.46 MIXED INCONTINENCE URGE AND STRESS: ICD-10-CM

## 2024-12-23 RX ORDER — MIRABEGRON 50 MG/1
50 TABLET, FILM COATED, EXTENDED RELEASE ORAL DAILY
Qty: 30 TABLET | Refills: 5 | Status: SHIPPED | OUTPATIENT
Start: 2024-12-23

## 2025-01-07 ENCOUNTER — HOSPITAL ENCOUNTER (OUTPATIENT)
Dept: INFUSION THERAPY | Age: 50
Setting detail: INFUSION SERIES
Discharge: HOME | End: 2025-01-07
Payer: MEDICAID

## 2025-01-07 VITALS
RESPIRATION RATE: 18 BRPM | OXYGEN SATURATION: 100 % | TEMPERATURE: 96.8 F | SYSTOLIC BLOOD PRESSURE: 130 MMHG | DIASTOLIC BLOOD PRESSURE: 88 MMHG | HEART RATE: 94 BPM

## 2025-01-07 DIAGNOSIS — G43.909 ACUTE MIGRAINE: Primary | ICD-10-CM

## 2025-01-07 PROCEDURE — 2500000003 HC RX 250 WO HCPCS: Performed by: NURSE PRACTITIONER

## 2025-01-07 PROCEDURE — 96374 THER/PROPH/DIAG INJ IV PUSH: CPT

## 2025-01-07 PROCEDURE — 6360000002 HC RX W HCPCS: Performed by: NURSE PRACTITIONER

## 2025-01-07 PROCEDURE — 96375 TX/PRO/DX INJ NEW DRUG ADDON: CPT

## 2025-01-07 PROCEDURE — 96360 HYDRATION IV INFUSION INIT: CPT

## 2025-01-07 PROCEDURE — 2580000003 HC RX 258: Performed by: NURSE PRACTITIONER

## 2025-01-07 RX ORDER — HEPARIN 100 UNIT/ML
500 SYRINGE INTRAVENOUS PRN
OUTPATIENT
Start: 2025-01-07

## 2025-01-07 RX ORDER — DIPHENHYDRAMINE HYDROCHLORIDE 50 MG/ML
50 INJECTION INTRAMUSCULAR; INTRAVENOUS ONCE
Status: CANCELLED
Start: 2025-01-07 | End: 2025-01-07

## 2025-01-07 RX ORDER — PROMETHAZINE HYDROCHLORIDE 25 MG/ML
25 INJECTION, SOLUTION INTRAMUSCULAR; INTRAVENOUS ONCE
Status: CANCELLED
Start: 2025-01-07 | End: 2025-01-07

## 2025-01-07 RX ORDER — 0.9 % SODIUM CHLORIDE 0.9 %
500 INTRAVENOUS SOLUTION INTRAVENOUS ONCE
Status: CANCELLED
Start: 2025-01-07 | End: 2025-01-07

## 2025-01-07 RX ORDER — TRAZODONE HYDROCHLORIDE 50 MG/1
50 TABLET, FILM COATED ORAL NIGHTLY
Qty: 30 TABLET | Refills: 5 | Status: SHIPPED | OUTPATIENT
Start: 2025-01-07

## 2025-01-07 RX ORDER — DEXAMETHASONE SODIUM PHOSPHATE 10 MG/ML
10 INJECTION, SOLUTION INTRAMUSCULAR; INTRAVENOUS ONCE
Status: CANCELLED
Start: 2025-01-07 | End: 2025-01-07

## 2025-01-07 RX ORDER — PROMETHAZINE HYDROCHLORIDE 25 MG/ML
25 INJECTION, SOLUTION INTRAMUSCULAR; INTRAVENOUS ONCE
Status: DISCONTINUED | OUTPATIENT
Start: 2025-01-07 | End: 2025-01-07 | Stop reason: SDUPTHER

## 2025-01-07 RX ORDER — KETOROLAC TROMETHAMINE 30 MG/ML
30 INJECTION, SOLUTION INTRAMUSCULAR; INTRAVENOUS ONCE
Status: COMPLETED | OUTPATIENT
Start: 2025-01-07 | End: 2025-01-07

## 2025-01-07 RX ORDER — 0.9 % SODIUM CHLORIDE 0.9 %
500 INTRAVENOUS SOLUTION INTRAVENOUS ONCE
Status: CANCELLED
Start: 2025-01-07

## 2025-01-07 RX ORDER — 0.9 % SODIUM CHLORIDE 0.9 %
500 INTRAVENOUS SOLUTION INTRAVENOUS ONCE
Status: COMPLETED | OUTPATIENT
Start: 2025-01-07 | End: 2025-01-07

## 2025-01-07 RX ORDER — KETOROLAC TROMETHAMINE 30 MG/ML
30 INJECTION, SOLUTION INTRAMUSCULAR; INTRAVENOUS ONCE
Status: CANCELLED | OUTPATIENT
Start: 2025-01-07 | End: 2025-01-07

## 2025-01-07 RX ORDER — DIPHENHYDRAMINE HYDROCHLORIDE 50 MG/ML
50 INJECTION INTRAMUSCULAR; INTRAVENOUS ONCE
Status: COMPLETED | OUTPATIENT
Start: 2025-01-07 | End: 2025-01-07

## 2025-01-07 RX ORDER — DEXAMETHASONE SODIUM PHOSPHATE 10 MG/ML
10 INJECTION, SOLUTION INTRAMUSCULAR; INTRAVENOUS ONCE
Status: COMPLETED | OUTPATIENT
Start: 2025-01-07 | End: 2025-01-07

## 2025-01-07 RX ORDER — SODIUM CHLORIDE 0.9 % (FLUSH) 0.9 %
5-40 SYRINGE (ML) INJECTION PRN
Status: DISCONTINUED | OUTPATIENT
Start: 2025-01-07 | End: 2025-01-08 | Stop reason: HOSPADM

## 2025-01-07 RX ORDER — SODIUM CHLORIDE 0.9 % (FLUSH) 0.9 %
5-40 SYRINGE (ML) INJECTION PRN
Status: CANCELLED | OUTPATIENT
Start: 2025-01-07

## 2025-01-07 RX ORDER — SODIUM CHLORIDE 0.9 % (FLUSH) 0.9 %
5-40 SYRINGE (ML) INJECTION PRN
OUTPATIENT
Start: 2025-01-07

## 2025-01-07 RX ADMIN — SODIUM CHLORIDE, PRESERVATIVE FREE 20 ML: 5 INJECTION INTRAVENOUS at 16:40

## 2025-01-07 RX ADMIN — SODIUM CHLORIDE 500 ML: 9 INJECTION, SOLUTION INTRAVENOUS at 16:03

## 2025-01-07 RX ADMIN — PROMETHAZINE HYDROCHLORIDE 25 MG: 25 INJECTION INTRAMUSCULAR; INTRAVENOUS at 16:04

## 2025-01-07 RX ADMIN — SODIUM CHLORIDE, PRESERVATIVE FREE 10 ML: 5 INJECTION INTRAVENOUS at 16:22

## 2025-01-07 RX ADMIN — DEXAMETHASONE SODIUM PHOSPHATE 10 MG: 10 INJECTION, SOLUTION INTRAMUSCULAR; INTRAVENOUS at 16:23

## 2025-01-07 RX ADMIN — SODIUM CHLORIDE, PRESERVATIVE FREE 20 ML: 5 INJECTION INTRAVENOUS at 16:56

## 2025-01-07 RX ADMIN — SODIUM CHLORIDE, PRESERVATIVE FREE 10 ML: 5 INJECTION INTRAVENOUS at 16:24

## 2025-01-07 RX ADMIN — SODIUM CHLORIDE, PRESERVATIVE FREE 20 ML: 5 INJECTION INTRAVENOUS at 16:38

## 2025-01-07 RX ADMIN — DIPHENHYDRAMINE HYDROCHLORIDE 50 MG: 50 INJECTION INTRAMUSCULAR; INTRAVENOUS at 16:39

## 2025-01-07 RX ADMIN — KETOROLAC TROMETHAMINE 30 MG: 30 INJECTION, SOLUTION INTRAMUSCULAR at 16:20

## 2025-01-07 RX ADMIN — SODIUM CHLORIDE, PRESERVATIVE FREE 10 ML: 5 INJECTION INTRAVENOUS at 16:20

## 2025-01-07 ASSESSMENT — PAIN DESCRIPTION - LOCATION: LOCATION: HEAD

## 2025-01-07 ASSESSMENT — PAIN DESCRIPTION - DESCRIPTORS: DESCRIPTORS: TIGHTNESS;THROBBING

## 2025-01-07 ASSESSMENT — PAIN SCALES - GENERAL
PAINLEVEL_OUTOF10: 0
PAINLEVEL_OUTOF10: 8

## 2025-01-07 NOTE — FLOWSHEET NOTE
01/07/25 7837   AVS Reviewed   AVS & discharge instructions reviewed with patient and/or representative? Yes   Reviewed instructions with Patient   Level of Understanding Questions answered;Verbalized understanding     PT RECEIVED MIGRAINE THERAPY VIA PERIPHERAL IV PER MD ORDERS. PRIOR TO TREATMENT PT WITH SEVERE HEAD THROBBING PAIN ALONG WITH NAUSEA AND VOMITING PAIN LEVEL OF 8 UPON ARRIVAL. AFTER IV MEDICATIONS AND HYDRATION,  PAIN LEVEL IS 0. PT STATES SHE FEELS MUCH BETTER WITH NO NAUSEA OR VOMITING NOTED.

## 2025-01-07 NOTE — DISCHARGE INSTRUCTIONS
ketorolac (oral/injection)  Pronunciation:  CESAR toe ROLE ak  Brand:  Toradol  What is the most important information I should know about ketorolac?  Ketorolac can increase your risk of fatal heart attack or stroke. Do not use this medicine just before or after heart bypass surgery (coronary artery bypass graft, or CABG). Ketorolac may also cause stomach or intestinal bleeding, which can be fatal.  You should not use ketorolac if you have any active or recent bleeding (including bleeding inside your body), a head injury, a stomach ulcer, severe kidney disease, a bleeding or blood-clotting disorder, a history of severe allergic reaction to aspirin or an NSAID, if you are scheduled to have surgery, if you are in late pregnancy, or if you are breast-feeding a baby.  You should not use ketorolac if you also take pentoxifylline, probenecid, aspirin, or other NSAIDs.  What is ketorolac?  Ketorolac is a nonsteroidal anti-inflammatory drug (NSAID) that is used short-term (5 days or less) to treat moderate to severe pain.  Ketorolac may also be used for purposes not listed in this medication guide.  What should I discuss with my healthcare provider before taking ketorolac?  Ketorolac can increase your risk of fatal heart attack or stroke, even if you don't have any risk factors. Do not use this medicine just before or after heart bypass surgery (coronary artery bypass graft, or CABG).  Ketorolac may also cause stomach or intestinal bleeding, which can be fatal. These conditions can occur without warning while you are using ketorolac, especially in older adults.  You should not use ketorolac if you are allergic to it, or if you have:  active or recent stomach ulcer, stomach bleeding, or intestinal bleeding;  a bleeding or blood-clotting disorder;  a closed head injury or bleeding in your brain;  bleeding from a recent surgery;  severe kidney disease or dehydration;  a history of asthma or severe allergic reaction  treatment, or if you have a fever with a headache, cough, or skin rash.  If you need surgery or medical tests, tell the doctor ahead of time if you have taken a cold medicine within the past few days.  Store at room temperature away from moisture and heat.  What happens if I miss a dose?  Since diphenhydramine and phenylephrine is used when needed, you may not be on a dosing schedule. Skip any missed dose if it's almost time for your next dose. Do not use two doses at one time.  What happens if I overdose?  Seek emergency medical attention or call the Poison Help line at 1-595.269.7640. An overdose of diphenhydramine and phenylephrine can be fatal.  Overdose symptoms may include vomiting, confusion, severe drowsiness, ringing in your ears, no urination, very dry eyes and mouth, dilated pupils, fast heartbeats, tremor, agitation, hallucinations, or seizure.  What should I avoid while taking diphenhydramine and phenylephrine?  Avoid driving or hazardous activity until you know how this medicine will affect you. Your reactions could be impaired.  Ask a doctor or pharmacist before using any other medicine that may contain diphenhydramine. This includes medicines for sleep, cold/allergy symptoms, or anti-itch medicine used on the skin. Using too much diphenhydramine may lead to a fatal overdose.  Drinking alcohol with this medicine can cause side effects.  What are the possible side effects of diphenhydramine and phenylephrine?  Get emergency medical help if you have signs of an allergic reaction: hives; difficult breathing; swelling of your face, lips, tongue, or throat.  Stop using this medicine and call your doctor at once if you have:  severe drowsiness;  sleeplessness;  painful or difficult urination;  fast, pounding, or uneven heartbeats;  trouble breathing;  anxiety, nervousness;  a seizure; or  problems with balance or speech.  Common side effects may include:  dizziness, drowsiness, headache;  dry mouth, nose,

## 2025-02-02 SDOH — ECONOMIC STABILITY: TRANSPORTATION INSECURITY
IN THE PAST 12 MONTHS, HAS THE LACK OF TRANSPORTATION KEPT YOU FROM MEDICAL APPOINTMENTS OR FROM GETTING MEDICATIONS?: NO

## 2025-02-02 SDOH — ECONOMIC STABILITY: FOOD INSECURITY: WITHIN THE PAST 12 MONTHS, THE FOOD YOU BOUGHT JUST DIDN'T LAST AND YOU DIDN'T HAVE MONEY TO GET MORE.: SOMETIMES TRUE

## 2025-02-02 SDOH — ECONOMIC STABILITY: FOOD INSECURITY: WITHIN THE PAST 12 MONTHS, YOU WORRIED THAT YOUR FOOD WOULD RUN OUT BEFORE YOU GOT MONEY TO BUY MORE.: OFTEN TRUE

## 2025-02-02 SDOH — ECONOMIC STABILITY: INCOME INSECURITY: IN THE LAST 12 MONTHS, WAS THERE A TIME WHEN YOU WERE NOT ABLE TO PAY THE MORTGAGE OR RENT ON TIME?: YES

## 2025-02-02 ASSESSMENT — PATIENT HEALTH QUESTIONNAIRE - PHQ9
SUM OF ALL RESPONSES TO PHQ QUESTIONS 1-9: 0
1. LITTLE INTEREST OR PLEASURE IN DOING THINGS: NOT AT ALL
2. FEELING DOWN, DEPRESSED OR HOPELESS: NOT AT ALL
SUM OF ALL RESPONSES TO PHQ9 QUESTIONS 1 & 2: 0
SUM OF ALL RESPONSES TO PHQ QUESTIONS 1-9: 0

## 2025-02-03 ASSESSMENT — PATIENT HEALTH QUESTIONNAIRE - PHQ9
2. FEELING DOWN, DEPRESSED OR HOPELESS: NOT AT ALL
SUM OF ALL RESPONSES TO PHQ9 QUESTIONS 1 & 2: 0
1. LITTLE INTEREST OR PLEASURE IN DOING THINGS: NOT AT ALL

## 2025-02-04 ENCOUNTER — OFFICE VISIT (OUTPATIENT)
Dept: PRIMARY CARE CLINIC | Age: 50
End: 2025-02-04
Payer: MEDICAID

## 2025-02-04 VITALS
TEMPERATURE: 98.1 F | OXYGEN SATURATION: 100 % | HEIGHT: 67 IN | WEIGHT: 227 LBS | RESPIRATION RATE: 16 BRPM | BODY MASS INDEX: 35.63 KG/M2 | SYSTOLIC BLOOD PRESSURE: 152 MMHG | DIASTOLIC BLOOD PRESSURE: 94 MMHG | HEART RATE: 96 BPM

## 2025-02-04 DIAGNOSIS — I10 ESSENTIAL HYPERTENSION: ICD-10-CM

## 2025-02-04 DIAGNOSIS — F43.21 GRIEF: ICD-10-CM

## 2025-02-04 DIAGNOSIS — N89.8 VAGINAL DISCHARGE: Primary | ICD-10-CM

## 2025-02-04 DIAGNOSIS — G43.109 MIGRAINE WITH AURA AND WITHOUT STATUS MIGRAINOSUS, NOT INTRACTABLE: ICD-10-CM

## 2025-02-04 DIAGNOSIS — I67.1 BRAIN ANEURYSM: ICD-10-CM

## 2025-02-04 PROCEDURE — 3080F DIAST BP >= 90 MM HG: CPT | Performed by: FAMILY MEDICINE

## 2025-02-04 PROCEDURE — 3077F SYST BP >= 140 MM HG: CPT | Performed by: FAMILY MEDICINE

## 2025-02-04 PROCEDURE — 99214 OFFICE O/P EST MOD 30 MIN: CPT | Performed by: FAMILY MEDICINE

## 2025-02-04 RX ORDER — LISINOPRIL 10 MG/1
10 TABLET ORAL DAILY
Qty: 90 TABLET | Refills: 3 | Status: SHIPPED | OUTPATIENT
Start: 2025-02-04

## 2025-02-04 ASSESSMENT — ENCOUNTER SYMPTOMS
VOMITING: 0
NAUSEA: 0
COUGH: 0
ABDOMINAL PAIN: 0
BACK PAIN: 0
EYE DISCHARGE: 0
COLOR CHANGE: 0
DIARRHEA: 0
WHEEZING: 0

## 2025-02-04 NOTE — PROGRESS NOTES
SUBJECTIVE:    Patient ID: Angelica Guerra is a 49 y.o. female.    History of Present Illness  The patient presents for a 6-month checkup as well as evaluation of vaginal odor, grief, hypertension, migraines, and shoulder pain.    She reports an unusual vaginal odor, a symptom she has not previously experienced. She has been abstinent from sexual activity for the past 5 years. She has been residing in [place] since the end of November 2024, where she typically uses her own bathroom during the day and her father's in the evening. Despite maintaining cleanliness, she expresses concern due to her father's septic episode in November 2024. She also reports an increase in vaginal discharge, which she attributes to the use of a probiotic prescribed by her urologist. This intervention has successfully prevented urinary tract infections. She is scheduled for urodynamic studies in March 2025.    She has been under significant stress since August 2024, which she believes may be contributing to her headaches. She has been experiencing frequent breakthrough headaches. She has been under the care of Dr. Sara Freitas, neurologist, but is dissatisfied with the lack of availability on Fridays. She has been advised to consult with Dr. Venegas, who is in the same practice as Dr. Freitas.    She has been experiencing significant grief following the loss of her mother on [place] Day. She has a strong support system in place, including regular therapy sessions every Tuesday at 10:00 AM. Her psychiatrist, Dr. Elliott, has proposed an increase in her Effexor dosage, contingent upon her return home and blood pressure monitoring. She has been preoccupied with caring for her parents and has not had adequate time to process her grief. She returned home the previous Saturday and has been seeking to establish a new routine. She has a supportive network, including a  friend who has recommended a grief support group.    She has been

## 2025-02-06 LAB
ATOPOBIUM VAGINAE: NORMAL SCORE
CANDIDA ALBICANS, NAA: NEGATIVE
CANDIDA GLABRATA: NEGATIVE
CHLAMYDIA TRACHOMATIS AMPLIFIED DET: NEGATIVE
MEGASHAERA: NORMAL SCORE
NEISSERIA GONORRHOEAE, DNA: NEGATIVE
VAGINAL PATHOGENS DNA PANEL: NEGATIVE
VAGINOSIS/VAGINITIS, DNA PROBE: NORMAL SCORE

## 2025-02-10 ENCOUNTER — HOSPITAL ENCOUNTER (EMERGENCY)
Age: 50
Discharge: LEFT BEFORE DISCHARGE | End: 2025-02-10

## 2025-02-10 VITALS
DIASTOLIC BLOOD PRESSURE: 103 MMHG | SYSTOLIC BLOOD PRESSURE: 160 MMHG | TEMPERATURE: 97.6 F | RESPIRATION RATE: 15 BRPM | HEART RATE: 103 BPM | OXYGEN SATURATION: 98 %

## 2025-02-11 ENCOUNTER — OFFICE VISIT (OUTPATIENT)
Dept: PRIMARY CARE CLINIC | Age: 50
End: 2025-02-11

## 2025-02-11 VITALS
WEIGHT: 226 LBS | HEIGHT: 67 IN | SYSTOLIC BLOOD PRESSURE: 150 MMHG | DIASTOLIC BLOOD PRESSURE: 92 MMHG | BODY MASS INDEX: 35.47 KG/M2 | RESPIRATION RATE: 16 BRPM | TEMPERATURE: 98 F | OXYGEN SATURATION: 100 % | HEART RATE: 91 BPM

## 2025-02-11 DIAGNOSIS — G43.909 MIGRAINE WITHOUT STATUS MIGRAINOSUS, NOT INTRACTABLE, UNSPECIFIED MIGRAINE TYPE: Primary | ICD-10-CM

## 2025-02-11 DIAGNOSIS — I10 ESSENTIAL HYPERTENSION: ICD-10-CM

## 2025-02-11 DIAGNOSIS — G43.109 MIGRAINE WITH AURA AND WITHOUT STATUS MIGRAINOSUS, NOT INTRACTABLE: ICD-10-CM

## 2025-02-11 RX ORDER — KETOROLAC TROMETHAMINE 30 MG/ML
60 INJECTION, SOLUTION INTRAMUSCULAR; INTRAVENOUS ONCE
Status: COMPLETED | OUTPATIENT
Start: 2025-02-11 | End: 2025-02-11

## 2025-02-11 RX ORDER — CLONIDINE HYDROCHLORIDE 0.1 MG/1
0.1 TABLET ORAL ONCE
Status: COMPLETED | OUTPATIENT
Start: 2025-02-11 | End: 2025-02-11

## 2025-02-11 RX ORDER — AMLODIPINE BESYLATE 2.5 MG/1
2.5 TABLET ORAL DAILY
Qty: 30 TABLET | Refills: 0 | Status: SHIPPED | OUTPATIENT
Start: 2025-02-11

## 2025-02-11 RX ADMIN — KETOROLAC TROMETHAMINE 60 MG: 30 INJECTION, SOLUTION INTRAMUSCULAR; INTRAVENOUS at 11:52

## 2025-02-11 RX ADMIN — CLONIDINE HYDROCHLORIDE 0.1 MG: 0.1 TABLET ORAL at 11:51

## 2025-02-11 ASSESSMENT — ENCOUNTER SYMPTOMS
EYE DISCHARGE: 0
VOMITING: 0
WHEEZING: 0
BACK PAIN: 0
NAUSEA: 0
DIARRHEA: 0
COUGH: 0
ABDOMINAL PAIN: 0
COLOR CHANGE: 0

## 2025-02-11 NOTE — PROGRESS NOTES
After obtaining consent, and per orders of Dr. Dsouza, injection of Toradol given in Right upper quad. gluteus by Jackelin To MA. Patient instructed to remain in clinic for 20 minutes afterwards, and to report any adverse reaction to me immediately.     Clonidine 0.1 mg was given to the pt.  
  HENT:  Negative for congestion and nosebleeds.    Eyes:  Negative for discharge.   Respiratory:  Negative for cough and wheezing.    Cardiovascular:  Negative for chest pain and leg swelling.   Gastrointestinal:  Negative for abdominal pain, diarrhea, nausea and vomiting.   Genitourinary:  Negative for difficulty urinating, frequency and urgency.   Musculoskeletal:  Negative for back pain and gait problem.   Skin:  Negative for color change and rash.   Neurological:  Positive for headaches. Negative for dizziness.   Hematological:  Does not bruise/bleed easily.   Psychiatric/Behavioral:  Negative for sleep disturbance and suicidal ideas.        OBJECTIVE:    Physical Exam  Vitals reviewed.   Constitutional:       General: She is not in acute distress.     Appearance: Normal appearance. She is well-developed. She is not diaphoretic.   HENT:      Head: Normocephalic and atraumatic.      Right Ear: External ear normal.      Left Ear: External ear normal.   Cardiovascular:      Rate and Rhythm: Normal rate and regular rhythm.      Pulses: Normal pulses.      Heart sounds: Normal heart sounds. No murmur heard.  Pulmonary:      Effort: Pulmonary effort is normal. No respiratory distress.      Breath sounds: Normal breath sounds.   Musculoskeletal:      Cervical back: Normal range of motion and neck supple.   Skin:     General: Skin is warm and dry.   Neurological:      General: No focal deficit present.      Mental Status: She is alert and oriented to person, place, and time. Mental status is at baseline.   Psychiatric:         Mood and Affect: Mood normal.         Behavior: Behavior normal.         Thought Content: Thought content normal.         Judgment: Judgment normal.        BP (!) 150/92   Pulse 91   Temp 98 °F (36.7 °C) (Temporal)   Resp 16   Ht 1.702 m (5' 7\")   Wt 102.5 kg (226 lb)   SpO2 100%   BMI 35.40 kg/m²      Assessment & Plan  1. Migraines.  The patient's migraines may be exacerbated by her

## 2025-02-18 ENCOUNTER — OFFICE VISIT (OUTPATIENT)
Dept: PRIMARY CARE CLINIC | Age: 50
End: 2025-02-18
Payer: MEDICAID

## 2025-02-18 VITALS
DIASTOLIC BLOOD PRESSURE: 74 MMHG | SYSTOLIC BLOOD PRESSURE: 124 MMHG | OXYGEN SATURATION: 98 % | BODY MASS INDEX: 34.53 KG/M2 | HEART RATE: 110 BPM | WEIGHT: 220 LBS | HEIGHT: 67 IN | TEMPERATURE: 97.4 F

## 2025-02-18 DIAGNOSIS — I10 ESSENTIAL HYPERTENSION: Primary | ICD-10-CM

## 2025-02-18 DIAGNOSIS — R51.9 NONINTRACTABLE HEADACHE, UNSPECIFIED CHRONICITY PATTERN, UNSPECIFIED HEADACHE TYPE: ICD-10-CM

## 2025-02-18 DIAGNOSIS — R53.83 FATIGUE, UNSPECIFIED TYPE: ICD-10-CM

## 2025-02-18 DIAGNOSIS — F43.21 GRIEF: ICD-10-CM

## 2025-02-18 LAB
ALBUMIN SERPL-MCNC: 4.3 G/DL (ref 3.5–5.2)
ALP SERPL-CCNC: 127 U/L (ref 35–104)
ALT SERPL-CCNC: 11 U/L (ref 5–33)
ANION GAP SERPL CALCULATED.3IONS-SCNC: 15 MMOL/L (ref 8–16)
AST SERPL-CCNC: 14 U/L (ref 5–32)
BASOPHILS # BLD: 0.1 K/UL (ref 0–0.2)
BASOPHILS NFR BLD: 0.6 % (ref 0–1)
BILIRUB SERPL-MCNC: 0.2 MG/DL (ref 0.2–1.2)
BUN SERPL-MCNC: 29 MG/DL (ref 6–20)
CALCIUM SERPL-MCNC: 9.2 MG/DL (ref 8.6–10)
CHLORIDE SERPL-SCNC: 105 MMOL/L (ref 98–107)
CO2 SERPL-SCNC: 23 MMOL/L (ref 22–29)
CREAT SERPL-MCNC: 0.9 MG/DL (ref 0.5–0.9)
CRP SERPL HS-MCNC: <0.3 MG/DL (ref 0–0.5)
EOSINOPHIL # BLD: 0 K/UL (ref 0–0.6)
EOSINOPHIL NFR BLD: 0.1 % (ref 0–5)
ERYTHROCYTE [DISTWIDTH] IN BLOOD BY AUTOMATED COUNT: 12.4 % (ref 11.5–14.5)
ERYTHROCYTE [SEDIMENTATION RATE] IN BLOOD BY WESTERGREN METHOD: 11 MM/HR (ref 0–25)
GLUCOSE SERPL-MCNC: 109 MG/DL (ref 70–99)
HCT VFR BLD AUTO: 39.7 % (ref 37–47)
HGB BLD-MCNC: 13.7 G/DL (ref 12–16)
IMM GRANULOCYTES # BLD: 0 K/UL
LYMPHOCYTES # BLD: 1.5 K/UL (ref 1.1–4.5)
LYMPHOCYTES NFR BLD: 19.3 % (ref 20–40)
MCH RBC QN AUTO: 30 PG (ref 27–31)
MCHC RBC AUTO-ENTMCNC: 34.5 G/DL (ref 33–37)
MCV RBC AUTO: 86.9 FL (ref 81–99)
MONOCYTES # BLD: 0.5 K/UL (ref 0–0.9)
MONOCYTES NFR BLD: 6.6 % (ref 0–10)
NEUTROPHILS # BLD: 5.7 K/UL (ref 1.5–7.5)
NEUTS SEG NFR BLD: 73.3 % (ref 50–65)
PLATELET # BLD AUTO: 294 K/UL (ref 130–400)
PMV BLD AUTO: 9.5 FL (ref 9.4–12.3)
POTASSIUM SERPL-SCNC: 3.8 MMOL/L (ref 3.5–5.1)
PROT SERPL-MCNC: 6.9 G/DL (ref 6.4–8.3)
RBC # BLD AUTO: 4.57 M/UL (ref 4.2–5.4)
RHEUMATOID FACT SER NEPH-ACNC: <10 IU/ML
SODIUM SERPL-SCNC: 143 MMOL/L (ref 136–145)
TSH SERPL DL<=0.005 MIU/L-ACNC: 1.06 UIU/ML (ref 0.27–4.2)
WBC # BLD AUTO: 7.7 K/UL (ref 4.8–10.8)

## 2025-02-18 PROCEDURE — 3074F SYST BP LT 130 MM HG: CPT | Performed by: FAMILY MEDICINE

## 2025-02-18 PROCEDURE — 3078F DIAST BP <80 MM HG: CPT | Performed by: FAMILY MEDICINE

## 2025-02-18 PROCEDURE — 99213 OFFICE O/P EST LOW 20 MIN: CPT | Performed by: FAMILY MEDICINE

## 2025-02-18 ASSESSMENT — ENCOUNTER SYMPTOMS
COLOR CHANGE: 0
COUGH: 0
NAUSEA: 0
DIARRHEA: 0
ABDOMINAL PAIN: 0
EYE DISCHARGE: 0
BACK PAIN: 0
VOMITING: 0
WHEEZING: 0

## 2025-02-18 NOTE — PROGRESS NOTES
SUBJECTIVE:    Patient ID: Angelica Guerra is a 49 y.o. female.    History of Present Illness  The patient presents for evaluation of fatigue, headache, and brain fog.    She reports a general feeling of well-being but experiences significant fatigue, necessitating periods of rest. She does not report any sinus-related issues. She has been under the care of a therapist since relocating to [place], who has expressed concern about a potential autoimmune condition. She is currently on disability due to her health issues. Despite her symptoms, she has been attempting to maintain physical activity through jogging.    She also mentions episodes of disorientation, such as forgetting her purpose for entering a room or losing track of her conversation. These cognitive issues were not present during her previous migraine episodes.    She has been monitoring her blood pressure at home, which has shown improvement, although it was slightly elevated during one check. She believes this improvement in blood pressure has contributed to a reduction in her headaches.    She takes Klonopin as needed, with the most recent dose taken today due to an elevated pulse rate of 113. She does not believe Klonopin is the cause of her symptoms, although she acknowledges it can induce drowsiness when taken three times daily.    FAMILY HISTORY  Her grandfather had rheumatoid arthritis.    MEDICATIONS  Current: Amlodipine, Klonopin      Past Medical History:   Diagnosis Date    Borderline diabetes     Brain aneurysm     GERD (gastroesophageal reflux disease)     Migraines     Overactive bladder     Panic attack as reaction to stress     Primary hypertension       Current Outpatient Medications on File Prior to Visit   Medication Sig Dispense Refill    metFORMIN (GLUCOPHAGE) 500 MG tablet TAKE 1 TABLET BY MOUTH IN THE MORNING THEN TAKE 1 TABLET BY MOUTH IN THE EVENING WITH MEALS 60 tablet 3    amLODIPine (NORVASC) 2.5 MG tablet Take 1 tablet by

## 2025-02-22 LAB — NUCLEAR IGG SER QL IA: DETECTED

## 2025-02-23 LAB
ANA PATTERN: ABNORMAL
ANA TITER: ABNORMAL
ANTINUCLEAR AB INTERPRETIVE COMMENT: ABNORMAL
DSDNA AB TITR SER CLIF: 3 IU (ref 0–24)
NUCLEAR IGG SER QL IF: DETECTED

## 2025-02-24 ENCOUNTER — PATIENT MESSAGE (OUTPATIENT)
Dept: PRIMARY CARE CLINIC | Age: 50
End: 2025-02-24

## 2025-02-24 DIAGNOSIS — R76.8 POSITIVE ANA (ANTINUCLEAR ANTIBODY): Primary | ICD-10-CM

## 2025-02-24 LAB — ENA RNP IGG SER IA-ACNC: 5 UNITS (ref 0–19)

## 2025-02-25 LAB
ENA JO1 AB TITR SER: 2 AU/ML (ref 0–40)
ENA SCL70 IGG SER QL: 0 AU/ML (ref 0–40)
ENA SM IGG SER-ACNC: 1 AU/ML (ref 0–40)
ENA SS-A 60KD AB SER-ACNC: 0 AU/ML (ref 0–40)
ENA SS-A IGG SER IA-ACNC: 2 AU/ML (ref 0–40)
ENA SS-B IGG SER IA-ACNC: 1 AU/ML (ref 0–40)

## 2025-03-03 RX ORDER — AMLODIPINE BESYLATE 2.5 MG/1
2.5 TABLET ORAL DAILY
Qty: 30 TABLET | Refills: 0 | Status: SHIPPED | OUTPATIENT
Start: 2025-03-03

## 2025-03-10 ENCOUNTER — OFFICE VISIT (OUTPATIENT)
Dept: NEUROLOGY | Age: 50
End: 2025-03-10
Payer: MEDICAID

## 2025-03-10 VITALS
BODY MASS INDEX: 34.53 KG/M2 | HEART RATE: 108 BPM | HEIGHT: 67 IN | DIASTOLIC BLOOD PRESSURE: 94 MMHG | OXYGEN SATURATION: 97 % | WEIGHT: 220 LBS | SYSTOLIC BLOOD PRESSURE: 136 MMHG

## 2025-03-10 DIAGNOSIS — G43.109 MIGRAINE WITH AURA AND WITHOUT STATUS MIGRAINOSUS, NOT INTRACTABLE: Primary | ICD-10-CM

## 2025-03-10 PROCEDURE — 99213 OFFICE O/P EST LOW 20 MIN: CPT | Performed by: NURSE PRACTITIONER

## 2025-03-10 NOTE — PROGRESS NOTES
[facility] Office Note      Patient:   Angelica Guerra  MR#:    [id number]  Account Number:                         YOB: 1975  Date of Evaluation:  3/10/2025  Time of Note:                          2:31 PM  Primary/Referring Physician:  Luna Rodriguez MD   Consulting Physician:  Sara Freitas DNP, APRN    FOLLOW UP    Chief Complaint   Patient presents with    Follow-up    Migraine     HISTORY OF PRESENT ILLNESS    Angelica Guerra is a 49 y.o. year old female here for follow up of headaches, history of aneurysm. Doing about the same from prior visit in regards to her headaches. She was recently diagnosed with auto immune disorder, possible sarcoidosis. Reports she will be seeing rheumatology in [place] here soon. She has noted quite a bit of stress recently as well. No change in characteristics of headaches. Headache is bifrontal with no radiation of pain. Pain is described as vice like. She has been noting a bitter taste in her mouth prior to some migraines, might last during the migraine as well. She notes nausea, vomiting, light/sound sensitivity with headaches. Notes blurred vision with headaches. Denies jensen visual loss. Denies visual aura. Denies focal symptoms with migraines. Some worsening with cough/valsalva. She is on Vyepti every 3 months, feels that the dose increase has been beneficial. Taking Ubrelvy prn with mixed results. She tried Zavzpret but not a lot of improvement. She previously had taken Toradol and Tramadol for a tailbone injury and felt these helped her migraines as well. She is noting 3-4 headaches in a month, can last for hours at a time but notes migraine post drome symptoms the following day- mental fogginess, fatigue. Has failed Emgality, Topamax, Propranolol, Amitriptyline. Triptan therapy contraindicated related to aneurysm history. Stress, weather changes are triggers for migraines. She had multiple surgeries for brain aneurysm (coils,

## 2025-03-10 NOTE — PROGRESS NOTES
REVIEW OF SYSTEMS    Constitutional: []Fever []Sweats []Chills [] Recent Injury [x] Denies all unless marked  HEENT:[x]Headache  [] Head Injury [] Hearing Loss  [] Sore Throat  [] Ear Ache [x] Denies all unless marked  Spine:  [] Neck pain  [] Back pain  [] Sciaticia  [x] Denies all unless marked  Cardiovascular:[]Heart Disease []Palpitations [] Chest Pain   [x] Denies all unless marked  Pulmonary: [x]Shortness of Breath []Cough   [x] Denies all unless marked  Psychiatric/Behavioral:[] Depression [] Anxiety [x] Denies all unless marked  Gastrointestinal: []Nausea  []Vomiting  []Abdominal Pain  []Constipation  []Diarrhea  [x] Denies all unless marked  Genitourinary:   [] Frequency  [] Urgency  [] Dysuria [] Incontinence  [x] Denies all unless marked  Extremities: []Pain  []Swelling  [x] Denies all unless marked  Musculoskeletal: [] Myalgias  [] Joint Pain  [] Arthritis [] Muscle Cramps [] Muscle Twitches  [x] Denies all unless marked  Sleep: []Insomnia[]Snoring []Restless Legs  []Sleep Apnea  []Daytime Sleepiness  [x] Denies all unless marked  Skin:[] Rash [] Color Change [x] Denies all unless marked   Neurological:[]Visual Disturbance [] Memory Loss []Loss of Balance []Slurred Speech []Weakness []Seizures  [x] Dizziness [x] Denies all unless marked

## 2025-03-12 ENCOUNTER — APPOINTMENT (OUTPATIENT)
Dept: INFUSION THERAPY | Age: 50
End: 2025-03-12
Payer: MEDICAID

## 2025-03-13 ENCOUNTER — HOSPITAL ENCOUNTER (OUTPATIENT)
Dept: INFUSION THERAPY | Age: 50
Setting detail: INFUSION SERIES
Discharge: HOME | End: 2025-03-13
Payer: MEDICAID

## 2025-03-13 VITALS
HEART RATE: 106 BPM | SYSTOLIC BLOOD PRESSURE: 137 MMHG | DIASTOLIC BLOOD PRESSURE: 59 MMHG | RESPIRATION RATE: 18 BRPM | OXYGEN SATURATION: 100 %

## 2025-03-13 DIAGNOSIS — G43.109 MIGRAINE WITH AURA AND WITHOUT STATUS MIGRAINOSUS, NOT INTRACTABLE: Primary | ICD-10-CM

## 2025-03-13 PROCEDURE — 2500000003 HC RX 250 WO HCPCS: Performed by: NURSE PRACTITIONER

## 2025-03-13 PROCEDURE — 6360000002 HC RX W HCPCS: Performed by: NURSE PRACTITIONER

## 2025-03-13 PROCEDURE — 2580000003 HC RX 258: Performed by: NURSE PRACTITIONER

## 2025-03-13 PROCEDURE — 96365 THER/PROPH/DIAG IV INF INIT: CPT

## 2025-03-13 RX ORDER — SODIUM CHLORIDE 0.9 % (FLUSH) 0.9 %
5-40 SYRINGE (ML) INJECTION PRN
OUTPATIENT
Start: 2025-06-05

## 2025-03-13 RX ORDER — SODIUM CHLORIDE 0.9 % (FLUSH) 0.9 %
5-40 SYRINGE (ML) INJECTION PRN
Status: DISCONTINUED | OUTPATIENT
Start: 2025-03-13 | End: 2025-03-14 | Stop reason: HOSPADM

## 2025-03-13 RX ORDER — SODIUM CHLORIDE 9 MG/ML
INJECTION, SOLUTION INTRAVENOUS CONTINUOUS
OUTPATIENT
Start: 2025-06-05

## 2025-03-13 RX ORDER — ACETAMINOPHEN 325 MG/1
650 TABLET ORAL
OUTPATIENT
Start: 2025-06-05

## 2025-03-13 RX ORDER — DIPHENHYDRAMINE HYDROCHLORIDE 50 MG/ML
50 INJECTION, SOLUTION INTRAMUSCULAR; INTRAVENOUS
OUTPATIENT
Start: 2025-06-05

## 2025-03-13 RX ORDER — ALBUTEROL SULFATE 90 UG/1
4 INHALANT RESPIRATORY (INHALATION) PRN
OUTPATIENT
Start: 2025-06-05

## 2025-03-13 RX ORDER — HYDROCORTISONE SODIUM SUCCINATE 100 MG/2ML
100 INJECTION INTRAMUSCULAR; INTRAVENOUS
OUTPATIENT
Start: 2025-06-05

## 2025-03-13 RX ORDER — EPINEPHRINE 1 MG/ML
0.3 INJECTION, SOLUTION, CONCENTRATE INTRAVENOUS PRN
OUTPATIENT
Start: 2025-06-05

## 2025-03-13 RX ORDER — SODIUM CHLORIDE 9 MG/ML
5-250 INJECTION, SOLUTION INTRAVENOUS PRN
OUTPATIENT
Start: 2025-06-05

## 2025-03-13 RX ORDER — ONDANSETRON 2 MG/ML
8 INJECTION INTRAMUSCULAR; INTRAVENOUS
OUTPATIENT
Start: 2025-06-05

## 2025-03-13 RX ADMIN — EPTINEZUMAB-JJMR 300 MG: 100 INJECTION INTRAVENOUS at 14:30

## 2025-03-13 RX ADMIN — SODIUM CHLORIDE, PRESERVATIVE FREE 10 ML: 5 INJECTION INTRAVENOUS at 15:02

## 2025-03-13 NOTE — DISCHARGE INSTRUCTIONS
eptinezumab  Pronunciation:  EP ti TARI ue mab  Brand:  Vyepti  What is the most important information I should know about eptinezumab?  Before using eptinezumab tell your doctor about all your medical conditions or allergies, all medicines you use, and if you are pregnant or breastfeeding.  What is eptinezumab?  Eptinezumab is used to prevent migraine headaches in adults.  Eptinezumab may also be used for purposes not listed in this medication guide.  What should I discuss with my healthcare provider before receiving eptinezumab?  You should not be treated with this medicine if you are allergic to eptinezumab, polysorbate 80, or sorbitol.  Tell your doctor about all your medical conditions or allergies.  Tell your doctor if you are pregnant or breastfeeding.  It is not known whether eptinezumab will harm an unborn baby. However, having migraine headaches may cause complications during pregnancy such as dangerously high blood pressure that can lead to medical problems in both mother and baby. The benefit of preventing migraines may outweigh any risks to the baby.  How is eptinezumab given?  Eptinezumab is given as an infusion into a vein. A healthcare provider will give you this injection.  Eptinezumab is usually given once every 3 months.  This medicine must be given slowly, and the infusion can take at least 30 minutes to complete.  What happens if I miss a dose?  Call your doctor for instructions if you miss an appointment for your eptinezumab injection.  What happens if I overdose?  Since this medicine is given by a healthcare professional in a medical setting, an overdose is unlikely to occur.  What should I avoid while receiving eptinezumab?  Follow your doctor's instructions about any restrictions on food, beverages, or activity.  What are the possible side effects of eptinezumab?  Get emergency medical help if you have signs of an allergic reaction: hives, rash; sudden warmth or tingling; difficult  construed to indicate that the drug or drug combination is safe, effective or appropriate for any given patient. [facility] does not assume any responsibility for any aspect of healthcare administered with the aid of information [facility] provides. The information contained herein is not intended to cover all possible uses, directions, precautions, warnings, drug interactions, allergic reactions, or adverse effects. If you have questions about the drugs you are taking, check with your doctor, nurse or pharmacist.  Copyright 1295-6561 [facility] [facility]. Version: 1.01. Revision date: 3/25/2020.  Care instructions adapted under license by [employer]. If you have questions about a medical condition or this instruction, always ask your healthcare professional. [employer] disclaims any warranty or liability for your use of this information.

## 2025-03-19 ENCOUNTER — OFFICE VISIT (OUTPATIENT)
Dept: PRIMARY CARE CLINIC | Age: 50
End: 2025-03-19
Payer: MEDICAID

## 2025-03-19 VITALS
OXYGEN SATURATION: 97 % | HEIGHT: 67 IN | DIASTOLIC BLOOD PRESSURE: 86 MMHG | BODY MASS INDEX: 36.13 KG/M2 | WEIGHT: 230.2 LBS | TEMPERATURE: 97.9 F | SYSTOLIC BLOOD PRESSURE: 136 MMHG | HEART RATE: 107 BPM | RESPIRATION RATE: 16 BRPM

## 2025-03-19 DIAGNOSIS — R76.8 POSITIVE ANA (ANTINUCLEAR ANTIBODY): ICD-10-CM

## 2025-03-19 DIAGNOSIS — I67.1 BRAIN ANEURYSM: ICD-10-CM

## 2025-03-19 DIAGNOSIS — G43.909 MIGRAINE WITHOUT STATUS MIGRAINOSUS, NOT INTRACTABLE, UNSPECIFIED MIGRAINE TYPE: Primary | ICD-10-CM

## 2025-03-19 DIAGNOSIS — I10 ESSENTIAL HYPERTENSION: ICD-10-CM

## 2025-03-19 DIAGNOSIS — H04.129 DRY EYE: ICD-10-CM

## 2025-03-19 PROCEDURE — 3079F DIAST BP 80-89 MM HG: CPT | Performed by: FAMILY MEDICINE

## 2025-03-19 PROCEDURE — 99213 OFFICE O/P EST LOW 20 MIN: CPT | Performed by: FAMILY MEDICINE

## 2025-03-19 PROCEDURE — 96372 THER/PROPH/DIAG INJ SC/IM: CPT | Performed by: FAMILY MEDICINE

## 2025-03-19 PROCEDURE — 3075F SYST BP GE 130 - 139MM HG: CPT | Performed by: FAMILY MEDICINE

## 2025-03-19 RX ORDER — KETOROLAC TROMETHAMINE 30 MG/ML
60 INJECTION, SOLUTION INTRAMUSCULAR; INTRAVENOUS ONCE
Status: COMPLETED | OUTPATIENT
Start: 2025-03-19 | End: 2025-03-19

## 2025-03-19 RX ADMIN — KETOROLAC TROMETHAMINE 60 MG: 30 INJECTION, SOLUTION INTRAMUSCULAR; INTRAVENOUS at 10:28

## 2025-03-19 ASSESSMENT — ENCOUNTER SYMPTOMS
BACK PAIN: 0
COLOR CHANGE: 0
ABDOMINAL PAIN: 0
EYE ITCHING: 1
VOMITING: 0
DIARRHEA: 0
WHEEZING: 0
COUGH: 0
EYE DISCHARGE: 0
NAUSEA: 0

## 2025-03-19 NOTE — PROGRESS NOTES
found.       Medication Contract and Consent for Opioid Use Documents Filed        No documents found                     EMR Dragon/transcription disclaimer:  Much of this encounter note is electronic transcription/translation of spoken language toprinted texts.  The electronic translation of spoken language may be erroneous, or at times, nonsensical words or phrases may be inadvertently transcribed.  Although I have reviewed the note for such errors, some may still exist.    The patient (or guardian, if applicable) and other individuals in attendance with the patient were advised that Artificial Intelligence will be utilized during this visit to record, process the conversation to generate a clinical note, and support improvement of the AI technology. The patient (or guardian, if applicable) and other individuals in attendance at the appointment consented to the use of AI, including the recording.

## 2025-03-27 DIAGNOSIS — G43.109 MIGRAINE WITH AURA AND WITHOUT STATUS MIGRAINOSUS, NOT INTRACTABLE: Primary | ICD-10-CM

## 2025-03-27 RX ORDER — ONDANSETRON 4 MG/1
TABLET, ORALLY DISINTEGRATING ORAL
Qty: 21 TABLET | Refills: 3 | Status: SHIPPED | OUTPATIENT
Start: 2025-03-27

## 2025-03-27 RX ORDER — UBROGEPANT 100 MG/1
TABLET ORAL
Qty: 10 TABLET | Refills: 3 | Status: SHIPPED | OUTPATIENT
Start: 2025-03-27

## 2025-03-27 NOTE — TELEPHONE ENCOUNTER
Requested Prescriptions     Pending Prescriptions Disp Refills    Ubrogepant (UBRELVY) 100 MG TABS [Pharmacy Med Name: UBRELVY 100 MG TABLET] 10 tablet 3     Sig: TAKE 1 TABLET BY MOUTH AT ONSET OF MIGRAINE MAY REPEAT 1 TIME IN 2 HOURS IF NO RELIEF **DO NOT EXCEED 2 TABLETS IN 24 HOURS       Last Office Visit: 3/10/2025  Next Office Visit: 9/10/2025  Last Medication Refill:1/7/2025

## 2025-03-28 ENCOUNTER — OFFICE VISIT (OUTPATIENT)
Age: 50
End: 2025-03-28

## 2025-03-28 VITALS — WEIGHT: 230 LBS | HEIGHT: 67 IN | BODY MASS INDEX: 36.1 KG/M2

## 2025-03-28 DIAGNOSIS — M25.511 CHRONIC RIGHT SHOULDER PAIN: Primary | ICD-10-CM
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
## 2025-03-28 DIAGNOSIS — M67.911 TENDINOPATHY OF RIGHT ROTATOR CUFF: ICD-10-CM

## 2025-03-28 DIAGNOSIS — G89.29 CHRONIC RIGHT SHOULDER PAIN: Primary | ICD-10-CM

## 2025-03-28 RX ORDER — BETAMETHASONE SODIUM PHOSPHATE AND BETAMETHASONE ACETATE 3; 3 MG/ML; MG/ML
6 INJECTION, SUSPENSION INTRA-ARTICULAR; INTRALESIONAL; INTRAMUSCULAR; SOFT TISSUE ONCE
Status: COMPLETED | OUTPATIENT
Start: 2025-03-28 | End: 2025-03-28

## 2025-03-28 RX ORDER — LIDOCAINE HYDROCHLORIDE 10 MG/ML
1 INJECTION, SOLUTION INFILTRATION; PERINEURAL ONCE
Status: COMPLETED | OUTPATIENT
Start: 2025-03-28 | End: 2025-03-28

## 2025-03-28 RX ORDER — BUPIVACAINE HYDROCHLORIDE 5 MG/ML
3 INJECTION, SOLUTION EPIDURAL; INTRACAUDAL; PERINEURAL ONCE
Status: COMPLETED | OUTPATIENT
Start: 2025-03-28 | End: 2025-03-28

## 2025-03-28 RX ADMIN — BETAMETHASONE SODIUM PHOSPHATE AND BETAMETHASONE ACETATE 6 MG: 3; 3 INJECTION, SUSPENSION INTRA-ARTICULAR; INTRALESIONAL; INTRAMUSCULAR; SOFT TISSUE at 10:02

## 2025-03-28 RX ADMIN — BUPIVACAINE HYDROCHLORIDE 15 MG: 5 INJECTION, SOLUTION EPIDURAL; INTRACAUDAL; PERINEURAL at 10:03

## 2025-03-28 RX ADMIN — LIDOCAINE HYDROCHLORIDE 1 ML: 10 INJECTION, SOLUTION INFILTRATION; PERINEURAL at 10:03

## 2025-03-28 NOTE — PROGRESS NOTES
osteoarthritis.     MRI SHOULDER RIGHT W CONTRAST  Order: [id number]   Status: Final result       Next appt: 05/12/2025 at 09:30 AM in Orthopedic Surgery (Delon Coreas, APRN - CNP)       Dx: Right shoulder pain, unspecified ...    Test Result Released: Yes (not seen)    0 Result Notes  Details    Reading Physician Reading Date Result Priority   Amanda Smith MD  [phone]     4/19/2022      Narrative & Impression  Examination. MRI SHOULDER RIGHT W CONTRAST 4/19/2022 10:09 AM  History: The patient complains of shoulder pain.  The multiplanar, multisequence MR imaging of the right shoulder is  performed after intra-articular contrast administration.  There is no previous similar study for comparison.  There is partial thickness tear of the articular side of the distal  supraspinatus tendon. There is signal inside the tendon suggesting  supraspinatus tendinopathy. No retraction.  The tendons infraspinatus and teres minor are intact.  There is tear of the superior/cranial half of the tendon  subscapularis. There is a resultant mild medial subluxation of the  tendon long head of the biceps which is accentuated due to a medial  location of the humeral head in the study.  There is leakage of the fluid into the subdeltoid bursa.  Capsular labral complex is normal in intact.  There is moderate arthropathy of the acromioclavicular joint with a  mild impingement.  There are small subarticular subcortical cystic changes of the  superolateral aspect of the humeral head and the head neck junction  representing chronic degenerative process.  The bone marrow signal of the proximal humerus, the scapula and the  clavicle are normal.  IMPRESSION:  1. There is superior/cranial fiber of the subscapularis,  avulsed from  the proximal lesser tuberosity. Contrast extravasation into the  subscapularis bursa.  2. Partial-thickness articular surface tear of the distal  supraspinatus tendon. Supraspinatus tendinopathy.  3.

## 2025-03-31 ENCOUNTER — TELEPHONE (OUTPATIENT)
Age: 50
End: 2025-03-31

## 2025-04-07 RX ORDER — AMLODIPINE BESYLATE 2.5 MG/1
2.5 TABLET ORAL DAILY
Qty: 30 TABLET | Refills: 0 | Status: SHIPPED | OUTPATIENT
Start: 2025-04-07

## 2025-04-15 ENCOUNTER — TELEPHONE (OUTPATIENT)
Dept: PRIMARY CARE CLINIC | Age: 50
End: 2025-04-15

## 2025-04-15 NOTE — TELEPHONE ENCOUNTER
Received fax from [employer] for an physical restriction form to be filled out.    Sent fax back stating we do not fill these out or evaluate for this and that the patient would need to go through the disability office for this.

## 2025-04-25 ENCOUNTER — OFFICE VISIT (OUTPATIENT)
Dept: PRIMARY CARE CLINIC | Age: 50
End: 2025-04-25
Payer: MEDICAID

## 2025-04-25 VITALS
DIASTOLIC BLOOD PRESSURE: 86 MMHG | OXYGEN SATURATION: 99 % | HEIGHT: 67 IN | TEMPERATURE: 98.3 F | SYSTOLIC BLOOD PRESSURE: 136 MMHG | WEIGHT: 229.6 LBS | HEART RATE: 108 BPM | BODY MASS INDEX: 36.03 KG/M2 | RESPIRATION RATE: 16 BRPM

## 2025-04-25 DIAGNOSIS — L29.9 ITCHY SCALP: Primary | ICD-10-CM

## 2025-04-25 DIAGNOSIS — R76.8 POSITIVE ANA (ANTINUCLEAR ANTIBODY): ICD-10-CM

## 2025-04-25 DIAGNOSIS — I10 ESSENTIAL HYPERTENSION: ICD-10-CM

## 2025-04-25 PROCEDURE — 3079F DIAST BP 80-89 MM HG: CPT | Performed by: NURSE PRACTITIONER

## 2025-04-25 PROCEDURE — 99213 OFFICE O/P EST LOW 20 MIN: CPT | Performed by: NURSE PRACTITIONER

## 2025-04-25 PROCEDURE — 3075F SYST BP GE 130 - 139MM HG: CPT | Performed by: NURSE PRACTITIONER

## 2025-04-25 RX ORDER — CYCLOSPORINE 0.5 MG/ML
1 EMULSION OPHTHALMIC 2 TIMES DAILY
COMMUNITY
Start: 2025-04-14

## 2025-04-25 RX ORDER — KETOCONAZOLE 20 MG/ML
SHAMPOO, SUSPENSION TOPICAL
Qty: 120 ML | Refills: 0 | Status: SHIPPED | OUTPATIENT
Start: 2025-04-25

## 2025-04-25 RX ORDER — HYDROXYZINE HYDROCHLORIDE 25 MG/1
25 TABLET, FILM COATED ORAL 4 TIMES DAILY PRN
Qty: 120 TABLET | Refills: 1 | Status: SHIPPED | OUTPATIENT
Start: 2025-04-25

## 2025-04-25 NOTE — PROGRESS NOTES
[facility]  [place] [id number]  [facility] [id number] [phone]       Angelica Guerra is a 49 y.o. female who presents today for her medical conditions/complaints as noted below.  Angelica Guerra is c/o of Skin Problem (Dry eyes, lips and skin started back in December and thought related to weather, she is scheduled to see Rheumatologist in [place] in August her RA markers are elevated, Scalp started really itching on Sunday and only relief she can get is to put her head under cool water, benadryl is not helping)        HPI:     History of Present Illness  The patient presents for evaluation of scalp itching.    Scalp pruritus began on Sunday. Despite attempts to alleviate the symptoms with various over-the-counter products, including hydrocortisone and anti-itch liquid, no relief has been found. The only effective measures have been the administration of Benadryl every 4 hours and the application of cold water to the scalp. No recent changes in the use of shampoos or detergents are reported. Restasis has been used for approximately 1.5 months, prescribed to manage dryness that began in November or December. A burning sensation in the lips necessitates the use of medicated Chapstick every 2 to 3 days. Severe ocular discomfort was initially suspected to be allergic in nature, but the use of allergy eye drops resulted in a burning sensation. Skin flaking has been managed with 4 different types of lotions, including Aveeno. The rash has been controlled, but the scalp itching is particularly bothersome. No visible marks or flakes are present on the scalp. Fluid intake is maintained at least four 8-ounce bottles of water daily. Skin care primarily involves a Vaseline stick and Aveeno lotion. A history of full-body hives following a medication reaction is noted.    Upon returning from her father's house, an increase in blood pressure was noticed, which was

## 2025-04-25 NOTE — PATIENT INSTRUCTIONS
Use Ky jelly or Aquaphor     Increase water to 64 to 80 oz daily     Use thick emollient cream to skin     Avoid hot water

## 2025-05-01 ASSESSMENT — ENCOUNTER SYMPTOMS
COLOR CHANGE: 0
EYES NEGATIVE: 1
GASTROINTESTINAL NEGATIVE: 1
WHEEZING: 0
COUGH: 0
ALLERGIC/IMMUNOLOGIC NEGATIVE: 1
SHORTNESS OF BREATH: 0
ROS SKIN COMMENTS: ITCHY SCALP
RESPIRATORY NEGATIVE: 1

## 2025-05-05 RX ORDER — AMLODIPINE BESYLATE 2.5 MG/1
2.5 TABLET ORAL DAILY
Qty: 30 TABLET | Refills: 0 | Status: SHIPPED | OUTPATIENT
Start: 2025-05-05

## 2025-05-12 ENCOUNTER — OFFICE VISIT (OUTPATIENT)
Age: 50
End: 2025-05-12
Payer: MEDICAID

## 2025-05-12 VITALS — BODY MASS INDEX: 35.94 KG/M2 | WEIGHT: 229 LBS | HEIGHT: 67 IN

## 2025-05-12 DIAGNOSIS — M75.41 IMPINGEMENT SYNDROME OF RIGHT SHOULDER: ICD-10-CM

## 2025-05-12 DIAGNOSIS — M25.511 CHRONIC RIGHT SHOULDER PAIN: ICD-10-CM

## 2025-05-12 DIAGNOSIS — M67.911 TENDINOPATHY OF RIGHT ROTATOR CUFF: Primary | ICD-10-CM

## 2025-05-12 DIAGNOSIS — G89.29 CHRONIC RIGHT SHOULDER PAIN: ICD-10-CM

## 2025-05-12 DIAGNOSIS — M19.019 AC JOINT ARTHROPATHY: ICD-10-CM

## 2025-05-12 PROCEDURE — 99213 OFFICE O/P EST LOW 20 MIN: CPT

## 2025-05-12 RX ORDER — LIDOCAINE HYDROCHLORIDE 20 MG/ML
JELLY TOPICAL ONCE
COMMUNITY
Start: 2025-05-08

## 2025-05-12 RX ORDER — KETOROLAC TROMETHAMINE 10 MG/1
1 TABLET, FILM COATED ORAL EVERY 6 HOURS PRN
COMMUNITY
Start: 2024-11-15

## 2025-05-12 RX ORDER — BUPIVACAINE HYDROCHLORIDE 5 MG/ML
25 INJECTION, SOLUTION PERINEURAL ONCE
COMMUNITY
Start: 2025-05-08
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
## 2025-05-19 ENCOUNTER — OFFICE VISIT (OUTPATIENT)
Dept: UROLOGY | Age: 50
End: 2025-05-19
Payer: MEDICAID

## 2025-05-19 VITALS — BODY MASS INDEX: 36.16 KG/M2 | TEMPERATURE: 97.9 F | WEIGHT: 230.4 LBS | HEIGHT: 67 IN

## 2025-05-19 DIAGNOSIS — N39.0 RECURRENT UTI: ICD-10-CM

## 2025-05-19 DIAGNOSIS — N39.46 MIXED INCONTINENCE URGE AND STRESS: ICD-10-CM

## 2025-05-19 DIAGNOSIS — N32.81 OAB (OVERACTIVE BLADDER): Primary | ICD-10-CM

## 2025-05-19 PROCEDURE — 99214 OFFICE O/P EST MOD 30 MIN: CPT | Performed by: NURSE PRACTITIONER

## 2025-05-19 PROCEDURE — 51798 US URINE CAPACITY MEASURE: CPT | Performed by: NURSE PRACTITIONER

## 2025-05-19 RX ORDER — MIRABEGRON 50 MG/1
50 TABLET, FILM COATED, EXTENDED RELEASE ORAL DAILY
Qty: 90 TABLET | Refills: 3 | Status: SHIPPED | OUTPATIENT
Start: 2025-05-19

## 2025-05-19 RX ORDER — ASPIRIN 81 MG/1
81 TABLET, CHEWABLE ORAL DAILY
COMMUNITY
Start: 2025-05-19 | End: 2026-05-19

## 2025-05-19 NOTE — PROGRESS NOTES
Angelica Guerra is a 49 y.o. female who presents today   Chief Complaint   Patient presents with    Follow-up     I am here today for my 6 month follow up.        Patient is a 49-year-old female presents clinic today for follow-up.  History of OAB, enuresis, mixed incontinence.  I referred her to outlying urology however she has had multiple life interferences and has been unable to make it.  History of incomplete emptying with anticholinergics to include Vesicare.  She has been maintained on Myrbetriq 50 mg daily.  She has discontinued her vaginal estrogen and is taking some daily probiotics.  Overall symptoms are slightly improved.  She has not had any enuresis for at least a month.  Frequency and urgency depends on what she drinks.  Using pads occasionally.  Overall she seems to be improved.  She has had multiple other symptoms and recently diagnosed with RA.  Upcoming rheumatology appointment.  History of recurrent UTI she is only had 1 UTI since October 2024.    Previous urine C&S results:   10/9/2024 E. coli  6/17/2024 E. coli  2/27/2024 E. Coli  1/22/2024 E. Coli  7/19/2023 Enterobacter     Previous treatments tried for UTI's: antibiotics   Symptoms improve with treatment: yes    Past Medical History:   Diagnosis Date    Borderline diabetes     Brain aneurysm     GERD (gastroesophageal reflux disease)     Migraines     Overactive bladder     Panic attack as reaction to stress     Primary hypertension     RA (rheumatoid arthritis) ([facility])        Past Surgical History:   Procedure Laterality Date    KNEE SURGERY  2020    Scoped right knee and removed arthritis       Current Outpatient Medications   Medication Sig Dispense Refill    mirabegron (MYRBETRIQ) 50 MG TB24 Take 50 mg by mouth daily 90 tablet 3    lidocaine (XYLOCAINE) 2 % uro-jet Place into the urethra once      ketorolac (TORADOL) 10 MG tablet Take 1 tablet by mouth every 6 hours as needed      BUPivacaine (MARCAINE) 0.5 % injection 5 mLs once

## 2025-05-21 ASSESSMENT — ENCOUNTER SYMPTOMS
BACK PAIN: 0
VOMITING: 0
ABDOMINAL DISTENTION: 0
NAUSEA: 0
ABDOMINAL PAIN: 0

## 2025-05-22 DIAGNOSIS — L29.9 ITCHY SCALP: ICD-10-CM

## 2025-05-22 RX ORDER — KETOCONAZOLE 20 MG/ML
SHAMPOO, SUSPENSION TOPICAL
Qty: 120 ML | Refills: 0 | Status: SHIPPED | OUTPATIENT
Start: 2025-05-22

## 2025-05-28 ENCOUNTER — HOSPITAL ENCOUNTER (OUTPATIENT)
Dept: INFUSION THERAPY | Age: 50
Setting detail: INFUSION SERIES
Discharge: HOME | End: 2025-05-28
Payer: MEDICAID

## 2025-05-28 VITALS
SYSTOLIC BLOOD PRESSURE: 131 MMHG | TEMPERATURE: 97 F | OXYGEN SATURATION: 99 % | DIASTOLIC BLOOD PRESSURE: 77 MMHG | RESPIRATION RATE: 18 BRPM | HEART RATE: 82 BPM

## 2025-05-28 DIAGNOSIS — G43.909 ACUTE MIGRAINE: Primary | ICD-10-CM

## 2025-05-28 PROCEDURE — 96365 THER/PROPH/DIAG IV INF INIT: CPT

## 2025-05-28 PROCEDURE — 2580000003 HC RX 258: Performed by: NURSE PRACTITIONER

## 2025-05-28 PROCEDURE — 96361 HYDRATE IV INFUSION ADD-ON: CPT

## 2025-05-28 PROCEDURE — 96375 TX/PRO/DX INJ NEW DRUG ADDON: CPT

## 2025-05-28 PROCEDURE — 6360000002 HC RX W HCPCS: Performed by: NURSE PRACTITIONER

## 2025-05-28 RX ORDER — 0.9 % SODIUM CHLORIDE 0.9 %
500 INTRAVENOUS SOLUTION INTRAVENOUS ONCE
Status: CANCELLED
Start: 2025-05-28 | End: 2025-05-28

## 2025-05-28 RX ORDER — DEXAMETHASONE SODIUM PHOSPHATE 10 MG/ML
10 INJECTION, SOLUTION INTRAMUSCULAR; INTRAVENOUS ONCE
Status: CANCELLED
Start: 2025-05-28 | End: 2025-05-28

## 2025-05-28 RX ORDER — SODIUM CHLORIDE 0.9 % (FLUSH) 0.9 %
5-40 SYRINGE (ML) INJECTION PRN
OUTPATIENT
Start: 2025-05-28

## 2025-05-28 RX ORDER — KETOROLAC TROMETHAMINE 30 MG/ML
30 INJECTION, SOLUTION INTRAMUSCULAR; INTRAVENOUS ONCE
Status: COMPLETED | OUTPATIENT
Start: 2025-05-28 | End: 2025-05-28

## 2025-05-28 RX ORDER — PROMETHAZINE HYDROCHLORIDE 25 MG/ML
25 INJECTION, SOLUTION INTRAMUSCULAR; INTRAVENOUS ONCE
Status: DISCONTINUED | OUTPATIENT
Start: 2025-05-28 | End: 2025-05-28 | Stop reason: SDUPTHER

## 2025-05-28 RX ORDER — DIPHENHYDRAMINE HYDROCHLORIDE 50 MG/ML
50 INJECTION, SOLUTION INTRAMUSCULAR; INTRAVENOUS ONCE
Status: CANCELLED
Start: 2025-05-28 | End: 2025-05-28

## 2025-05-28 RX ORDER — PROMETHAZINE HYDROCHLORIDE 25 MG/ML
25 INJECTION, SOLUTION INTRAMUSCULAR; INTRAVENOUS ONCE
Status: CANCELLED
Start: 2025-05-28 | End: 2025-05-28

## 2025-05-28 RX ORDER — KETOROLAC TROMETHAMINE 30 MG/ML
30 INJECTION, SOLUTION INTRAMUSCULAR; INTRAVENOUS ONCE
Status: CANCELLED | OUTPATIENT
Start: 2025-05-28 | End: 2025-05-28

## 2025-05-28 RX ORDER — DIPHENHYDRAMINE HYDROCHLORIDE 50 MG/ML
50 INJECTION, SOLUTION INTRAMUSCULAR; INTRAVENOUS ONCE
Status: COMPLETED | OUTPATIENT
Start: 2025-05-28 | End: 2025-05-28

## 2025-05-28 RX ORDER — 0.9 % SODIUM CHLORIDE 0.9 %
500 INTRAVENOUS SOLUTION INTRAVENOUS ONCE
Status: COMPLETED | OUTPATIENT
Start: 2025-05-28 | End: 2025-05-28

## 2025-05-28 RX ORDER — HEPARIN 100 UNIT/ML
500 SYRINGE INTRAVENOUS PRN
OUTPATIENT
Start: 2025-05-28

## 2025-05-28 RX ORDER — SODIUM CHLORIDE 0.9 % (FLUSH) 0.9 %
5-40 SYRINGE (ML) INJECTION PRN
Status: DISCONTINUED | OUTPATIENT
Start: 2025-05-28 | End: 2025-05-29 | Stop reason: HOSPADM

## 2025-05-28 RX ORDER — DEXAMETHASONE SODIUM PHOSPHATE 10 MG/ML
10 INJECTION, SOLUTION INTRAMUSCULAR; INTRAVENOUS ONCE
Status: COMPLETED | OUTPATIENT
Start: 2025-05-28 | End: 2025-05-28

## 2025-05-28 RX ADMIN — DEXAMETHASONE SODIUM PHOSPHATE 10 MG: 10 INJECTION, SOLUTION INTRAMUSCULAR; INTRAVENOUS at 15:33

## 2025-05-28 RX ADMIN — DIPHENHYDRAMINE HYDROCHLORIDE 50 MG: 50 INJECTION INTRAMUSCULAR; INTRAVENOUS at 15:33

## 2025-05-28 RX ADMIN — SODIUM CHLORIDE 500 ML: 0.9 INJECTION, SOLUTION INTRAVENOUS at 15:35

## 2025-05-28 RX ADMIN — PROMETHAZINE HYDROCHLORIDE 25 MG: 25 INJECTION INTRAMUSCULAR; INTRAVENOUS at 16:04

## 2025-05-28 RX ADMIN — KETOROLAC TROMETHAMINE 30 MG: 30 INJECTION, SOLUTION INTRAMUSCULAR at 15:33

## 2025-05-28 NOTE — PROGRESS NOTES
PIV inserted. 500 mL NS, decadron 10 mg, benadryl 50 mg, toradol 30 mg, phenergan 25 mg given. Pt's son, Tramaine Guerra, driving pt home. Pt tolerated well.

## 2025-06-02 RX ORDER — AMLODIPINE BESYLATE 2.5 MG/1
2.5 TABLET ORAL DAILY
Qty: 30 TABLET | Refills: 0 | Status: SHIPPED | OUTPATIENT
Start: 2025-06-02

## 2025-06-05 ENCOUNTER — HOSPITAL ENCOUNTER (OUTPATIENT)
Dept: INFUSION THERAPY | Age: 50
Setting detail: INFUSION SERIES
Discharge: HOME | End: 2025-06-05
Payer: MEDICAID

## 2025-06-05 VITALS
RESPIRATION RATE: 18 BRPM | DIASTOLIC BLOOD PRESSURE: 75 MMHG | OXYGEN SATURATION: 100 % | TEMPERATURE: 97 F | HEART RATE: 97 BPM | SYSTOLIC BLOOD PRESSURE: 120 MMHG

## 2025-06-05 DIAGNOSIS — G43.109 MIGRAINE WITH AURA AND WITHOUT STATUS MIGRAINOSUS, NOT INTRACTABLE: Primary | ICD-10-CM

## 2025-06-05 PROCEDURE — 6360000002 HC RX W HCPCS: Performed by: NURSE PRACTITIONER

## 2025-06-05 PROCEDURE — 96365 THER/PROPH/DIAG IV INF INIT: CPT

## 2025-06-05 PROCEDURE — 2500000003 HC RX 250 WO HCPCS: Performed by: NURSE PRACTITIONER

## 2025-06-05 PROCEDURE — 2580000003 HC RX 258: Performed by: NURSE PRACTITIONER

## 2025-06-05 RX ORDER — DIPHENHYDRAMINE HYDROCHLORIDE 50 MG/ML
50 INJECTION, SOLUTION INTRAMUSCULAR; INTRAVENOUS
OUTPATIENT
Start: 2025-08-28

## 2025-06-05 RX ORDER — SODIUM CHLORIDE 9 MG/ML
INJECTION, SOLUTION INTRAVENOUS CONTINUOUS
OUTPATIENT
Start: 2025-08-28

## 2025-06-05 RX ORDER — ONDANSETRON 2 MG/ML
8 INJECTION INTRAMUSCULAR; INTRAVENOUS
OUTPATIENT
Start: 2025-08-28

## 2025-06-05 RX ORDER — HYDROCORTISONE SODIUM SUCCINATE 100 MG/2ML
100 INJECTION INTRAMUSCULAR; INTRAVENOUS
OUTPATIENT
Start: 2025-08-28

## 2025-06-05 RX ORDER — EPINEPHRINE 1 MG/ML
0.3 INJECTION, SOLUTION, CONCENTRATE INTRAVENOUS PRN
OUTPATIENT
Start: 2025-08-28

## 2025-06-05 RX ORDER — SODIUM CHLORIDE 0.9 % (FLUSH) 0.9 %
5-40 SYRINGE (ML) INJECTION PRN
Status: DISCONTINUED | OUTPATIENT
Start: 2025-06-05 | End: 2025-06-06 | Stop reason: HOSPADM

## 2025-06-05 RX ORDER — SODIUM CHLORIDE 9 MG/ML
5-250 INJECTION, SOLUTION INTRAVENOUS PRN
OUTPATIENT
Start: 2025-08-28

## 2025-06-05 RX ORDER — ACETAMINOPHEN 325 MG/1
650 TABLET ORAL
OUTPATIENT
Start: 2025-08-28

## 2025-06-05 RX ORDER — SODIUM CHLORIDE 0.9 % (FLUSH) 0.9 %
5-40 SYRINGE (ML) INJECTION PRN
OUTPATIENT
Start: 2025-08-28

## 2025-06-05 RX ORDER — ALBUTEROL SULFATE 90 UG/1
4 INHALANT RESPIRATORY (INHALATION) PRN
OUTPATIENT
Start: 2025-08-28

## 2025-06-05 RX ADMIN — SODIUM CHLORIDE, PRESERVATIVE FREE 10 ML: 5 INJECTION INTRAVENOUS at 14:41

## 2025-06-05 RX ADMIN — EPTINEZUMAB-JJMR 300 MG: 100 INJECTION INTRAVENOUS at 14:08

## 2025-06-05 NOTE — DISCHARGE INSTRUCTIONS
eptinezumab  Pronunciation:  EP ti TARI ue mab  Brand:  Vyepti  What is the most important information I should know about eptinezumab?  Before using eptinezumab tell your doctor about all your medical conditions or allergies, all medicines you use, and if you are pregnant or breastfeeding.  What is eptinezumab?  Eptinezumab is used to prevent migraine headaches in adults.  Eptinezumab may also be used for purposes not listed in this medication guide.  What should I discuss with my healthcare provider before receiving eptinezumab?  You should not be treated with this medicine if you are allergic to eptinezumab, polysorbate 80, or sorbitol.  Tell your doctor about all your medical conditions or allergies.  Tell your doctor if you are pregnant or breastfeeding.  It is not known whether eptinezumab will harm an unborn baby. However, having migraine headaches may cause complications during pregnancy such as dangerously high blood pressure that can lead to medical problems in both mother and baby. The benefit of preventing migraines may outweigh any risks to the baby.  How is eptinezumab given?  Eptinezumab is given as an infusion into a vein. A healthcare provider will give you this injection.  Eptinezumab is usually given once every 3 months.  This medicine must be given slowly, and the infusion can take at least 30 minutes to complete.  What happens if I miss a dose?  Call your doctor for instructions if you miss an appointment for your eptinezumab injection.  What happens if I overdose?  Since this medicine is given by a healthcare professional in a medical setting, an overdose is unlikely to occur.  What should I avoid while receiving eptinezumab?  Follow your doctor's instructions about any restrictions on food, beverages, or activity.  What are the possible side effects of eptinezumab?  Get emergency medical help if you have signs of an allergic reaction: hives, rash; sudden warmth or tingling; difficult

## 2025-06-05 NOTE — PROGRESS NOTES
This  was asked to visit with pt to possibly assist her with notary services, but was unable. Provided spiritual care with a listening ear, support, and a prayer. Pt says she is Baptism and her sloane is very important to her. She says she is getting ready to have brain surgery for an aneurysm. Pt expressed gratitude for spiritual care.       Spiritual Health History and Assessment/Progress Note  [facility]    Spiritual/Emotional Needs,  ,  ,      Name: Angelica Guerra MRN: [id number]    Age: 49 y.o.     Sex: female   Language: English   Hoahaoism: Baptism   <principal problem not specified>     Date: 6/5/2025            Total Time Calculated: 20 min              Spiritual Assessment began in [facility]        Referral/Consult From: Rounding   Encounter Overview/Reason: Spiritual/Emotional Needs  Service Provided For: Patient    [place], Belief, Meaning:   Patient identifies as spiritual and is connected with a sloane tradition or spiritual practice  Family/Friends No family/friends present      Importance and Influence:  Patient has spiritual/personal beliefs that influence decisions regarding their health  Family/Friends No family/friends present    Community:  Patient is connected with a spiritual community  Family/Friends No family/friends present    Assessment and Plan of Care:     Patient Interventions include: Provided sacramental/Buddhist ritual  Family/Friends Interventions include: No family/friends present    Patient Plan of Care: Spiritual Care available upon further referral  Family/Friends Plan of Care: No family/friends present    Electronically signed by Mary Behrens, Chaplain on 6/5/2025 at 2:32 PM

## 2025-06-11 ENCOUNTER — TELEPHONE (OUTPATIENT)
Dept: NEUROLOGY | Age: 50
End: 2025-06-11

## 2025-06-11 NOTE — TELEPHONE ENCOUNTER
Angelica Guerra (Key: [id number])  PA Case ID #: [id number]  Need Help? Call us at [phone]  Outcome  Approved today by [place] [employer] Med[employer]act 2017  The request has been approved. The authorization is effective from 06/11/2025 to 06/11/2026, as long as the member is enrolled in their current health plan. A written notification letter will follow with additional details.  Effective Date: 6/11/2025  Authorization Expiration Date: 6/11/2026  Drug  Zavzpret 10MG/ACT solution    Form  Med[employer] [place] Medicaid ePA Form 2017 NCPDP

## 2025-06-18 DIAGNOSIS — L29.9 ITCHY SCALP: ICD-10-CM

## 2025-06-18 RX ORDER — KETOCONAZOLE 20 MG/ML
SHAMPOO, SUSPENSION TOPICAL
Qty: 120 ML | Refills: 0 | Status: SHIPPED | OUTPATIENT
Start: 2025-06-18

## 2025-06-24 ENCOUNTER — TELEPHONE (OUTPATIENT)
Dept: NEUROLOGY | Facility: CLINIC | Age: 50
End: 2025-06-24
Payer: MEDICAID

## 2025-06-24 RX ORDER — HYDROXYZINE HYDROCHLORIDE 25 MG/1
25 TABLET, FILM COATED ORAL 4 TIMES DAILY PRN
Qty: 120 TABLET | Refills: 1 | Status: SHIPPED | OUTPATIENT
Start: 2025-06-24

## 2025-06-24 RX ORDER — TRAZODONE HYDROCHLORIDE 50 MG/1
50 TABLET ORAL NIGHTLY
Qty: 30 TABLET | Refills: 5 | Status: SHIPPED | OUTPATIENT
Start: 2025-06-24

## 2025-06-30 RX ORDER — OMEPRAZOLE 20 MG/1
CAPSULE, DELAYED RELEASE ORAL DAILY
Qty: 90 CAPSULE | Refills: 3 | Status: SHIPPED | OUTPATIENT
Start: 2025-06-30

## 2025-06-30 RX ORDER — AMLODIPINE BESYLATE 2.5 MG/1
2.5 TABLET ORAL DAILY
Qty: 90 TABLET | Refills: 1 | Status: SHIPPED | OUTPATIENT
Start: 2025-06-30

## 2025-07-15 DIAGNOSIS — L29.9 ITCHY SCALP: ICD-10-CM

## 2025-07-15 RX ORDER — KETOCONAZOLE 20 MG/ML
SHAMPOO, SUSPENSION TOPICAL
Qty: 120 ML | Refills: 0 | Status: SHIPPED | OUTPATIENT
Start: 2025-07-15

## 2025-07-29 ENCOUNTER — HOSPITAL ENCOUNTER (OUTPATIENT)
Dept: INFUSION THERAPY | Age: 50
Setting detail: INFUSION SERIES
Discharge: HOME | End: 2025-07-29
Payer: MEDICAID

## 2025-07-29 VITALS
RESPIRATION RATE: 18 BRPM | SYSTOLIC BLOOD PRESSURE: 126 MMHG | HEART RATE: 89 BPM | DIASTOLIC BLOOD PRESSURE: 86 MMHG | TEMPERATURE: 97 F | OXYGEN SATURATION: 98 %

## 2025-07-29 DIAGNOSIS — G43.909 ACUTE MIGRAINE: Primary | ICD-10-CM

## 2025-07-29 PROCEDURE — 96365 THER/PROPH/DIAG IV INF INIT: CPT

## 2025-07-29 PROCEDURE — 96361 HYDRATE IV INFUSION ADD-ON: CPT

## 2025-07-29 PROCEDURE — 2580000003 HC RX 258: Performed by: NURSE PRACTITIONER

## 2025-07-29 PROCEDURE — 2500000003 HC RX 250 WO HCPCS: Performed by: NURSE PRACTITIONER

## 2025-07-29 PROCEDURE — 96375 TX/PRO/DX INJ NEW DRUG ADDON: CPT

## 2025-07-29 PROCEDURE — 6360000002 HC RX W HCPCS: Performed by: NURSE PRACTITIONER

## 2025-07-29 RX ORDER — EPINEPHRINE 1 MG/ML
0.3 INJECTION, SOLUTION, CONCENTRATE INTRAVENOUS PRN
OUTPATIENT
Start: 2025-08-24

## 2025-07-29 RX ORDER — DIPHENHYDRAMINE HYDROCHLORIDE 50 MG/ML
50 INJECTION, SOLUTION INTRAMUSCULAR; INTRAVENOUS
OUTPATIENT
Start: 2025-08-24

## 2025-07-29 RX ORDER — 0.9 % SODIUM CHLORIDE 0.9 %
500 INTRAVENOUS SOLUTION INTRAVENOUS ONCE
Status: COMPLETED | OUTPATIENT
Start: 2025-07-29 | End: 2025-07-29

## 2025-07-29 RX ORDER — SODIUM CHLORIDE 9 MG/ML
5-250 INJECTION, SOLUTION INTRAVENOUS PRN
OUTPATIENT
Start: 2025-08-24

## 2025-07-29 RX ORDER — 0.9 % SODIUM CHLORIDE 0.9 %
500 INTRAVENOUS SOLUTION INTRAVENOUS ONCE
Status: CANCELLED
Start: 2025-07-29 | End: 2025-07-29

## 2025-07-29 RX ORDER — KETOROLAC TROMETHAMINE 30 MG/ML
30 INJECTION, SOLUTION INTRAMUSCULAR; INTRAVENOUS ONCE
Status: COMPLETED | OUTPATIENT
Start: 2025-07-29 | End: 2025-07-29

## 2025-07-29 RX ORDER — SODIUM CHLORIDE 0.9 % (FLUSH) 0.9 %
5-40 SYRINGE (ML) INJECTION PRN
Status: DISCONTINUED | OUTPATIENT
Start: 2025-07-29 | End: 2025-07-30 | Stop reason: HOSPADM

## 2025-07-29 RX ORDER — DIPHENHYDRAMINE HYDROCHLORIDE 50 MG/ML
50 INJECTION, SOLUTION INTRAMUSCULAR; INTRAVENOUS ONCE
Status: COMPLETED | OUTPATIENT
Start: 2025-07-29 | End: 2025-07-29

## 2025-07-29 RX ORDER — SODIUM CHLORIDE 0.9 % (FLUSH) 0.9 %
5-40 SYRINGE (ML) INJECTION PRN
OUTPATIENT
Start: 2025-07-29

## 2025-07-29 RX ORDER — KETOROLAC TROMETHAMINE 30 MG/ML
30 INJECTION, SOLUTION INTRAMUSCULAR; INTRAVENOUS ONCE
Status: CANCELLED | OUTPATIENT
Start: 2025-07-29 | End: 2025-07-29

## 2025-07-29 RX ORDER — SODIUM CHLORIDE 0.9 % (FLUSH) 0.9 %
5-40 SYRINGE (ML) INJECTION PRN
OUTPATIENT
Start: 2025-08-24

## 2025-07-29 RX ORDER — ALBUTEROL SULFATE 90 UG/1
4 INHALANT RESPIRATORY (INHALATION) PRN
OUTPATIENT
Start: 2025-08-24

## 2025-07-29 RX ORDER — ONDANSETRON 2 MG/ML
8 INJECTION INTRAMUSCULAR; INTRAVENOUS
OUTPATIENT
Start: 2025-08-24

## 2025-07-29 RX ORDER — ACETAMINOPHEN 325 MG/1
650 TABLET ORAL
OUTPATIENT
Start: 2025-08-24

## 2025-07-29 RX ORDER — DIPHENHYDRAMINE HYDROCHLORIDE 50 MG/ML
50 INJECTION, SOLUTION INTRAMUSCULAR; INTRAVENOUS ONCE
Status: CANCELLED
Start: 2025-07-29 | End: 2025-07-29

## 2025-07-29 RX ORDER — HYDROCORTISONE SODIUM SUCCINATE 100 MG/2ML
100 INJECTION INTRAMUSCULAR; INTRAVENOUS
OUTPATIENT
Start: 2025-08-24

## 2025-07-29 RX ORDER — HEPARIN 100 UNIT/ML
500 SYRINGE INTRAVENOUS PRN
OUTPATIENT
Start: 2025-07-29

## 2025-07-29 RX ORDER — SODIUM CHLORIDE 9 MG/ML
INJECTION, SOLUTION INTRAVENOUS CONTINUOUS
OUTPATIENT
Start: 2025-08-24

## 2025-07-29 RX ADMIN — DIPHENHYDRAMINE HYDROCHLORIDE 50 MG: 50 INJECTION INTRAMUSCULAR; INTRAVENOUS at 14:30

## 2025-07-29 RX ADMIN — SODIUM CHLORIDE, PRESERVATIVE FREE 10 ML: 5 INJECTION INTRAVENOUS at 14:30

## 2025-07-29 RX ADMIN — KETOROLAC TROMETHAMINE 30 MG: 30 INJECTION, SOLUTION INTRAMUSCULAR at 14:41

## 2025-07-29 RX ADMIN — SODIUM CHLORIDE 500 ML: 0.9 INJECTION, SOLUTION INTRAVENOUS at 14:30

## 2025-07-29 RX ADMIN — PROMETHAZINE HYDROCHLORIDE 25 MG: 25 INJECTION INTRAMUSCULAR; INTRAVENOUS at 14:52

## 2025-07-29 NOTE — DISCHARGE INSTRUCTIONS
Ketorolac  (otf toe role' ak)  Brand Name(s): Toradol®¶; also available generically  IMPORTANT WARNING:  Ketorolac is used for the short-term relief of moderately severe pain and should not be used for longer than 5 days, for mild pain, or for pain from chronic (long-term) conditions. You will receive your first doses of ketorolac by intravenous (into a vein) or intramuscular (into a muscle) injection in a hospital or medical office. After that, your doctor may choose to continue your treatment with oral ketorolac. You must stop taking oral ketorolac on the fifth day after you received your first ketorolac injection. Talk to your doctor if you still have pain after 5 days or if your pain is not controlled with this medication. Ketorolac may cause serious side effects, especially when taken improperly. Take ketorolac exactly as directed. Do not take more of it or take it more often than prescribed by your doctor.  People who take nonsteroidal anti-inflammatory drugs (NSAIDs) (other than aspirin) such as ketorolac may have a higher risk of having a heart attack or a stroke than people who do not take these medications. These events may happen without warning and may cause death. This risk may be higher for people who take NSAIDs for a long time. Do not take an NSAID such as ketorolac if you have recently had a heart attack, unless directed to do so by your doctor. Tell your doctor if you or anyone in your family has or has ever had heart disease, a heart attack, or a stroke or 'ministroke;' if you smoke; and if you have or have ever had high cholesterol, high blood pressure, bleeding or clotting problems, or diabetes. Get emergency medical help right away if you experience any of the following symptoms: chest pain, shortness of breath, weakness in one part or side of the body, or slurred speech.  If you are having surgery, including dental surgery, tell the doctor or dentist that you are taking ketorolac. If you

## 2025-07-29 NOTE — PROGRESS NOTES
Pt arrived to [facility]. PIV inserted and brisk blood return noted. Pt received Benadryl, Toradol, Phenergan and 500ml NS. Tolerated well. Pt's  at bedside, discharge paperwork signed.     Electronically signed by Pam Ojeda RN on 7/29/2025 at 3:46 PM

## 2025-08-11 ENCOUNTER — PATIENT MESSAGE (OUTPATIENT)
Dept: PRIMARY CARE CLINIC | Age: 50
End: 2025-08-11

## 2025-08-11 DIAGNOSIS — L29.9 ITCHY SCALP: ICD-10-CM

## 2025-08-11 DIAGNOSIS — K11.9 SALIVARY GLAND DISORDER: Primary | ICD-10-CM

## 2025-08-11 RX ORDER — KETOCONAZOLE 20 MG/ML
SHAMPOO, SUSPENSION TOPICAL
Qty: 120 ML | Refills: 0 | Status: SHIPPED | OUTPATIENT
Start: 2025-08-11

## 2025-08-12 RX ORDER — ONDANSETRON 4 MG/1
TABLET, ORALLY DISINTEGRATING ORAL
Qty: 21 TABLET | Refills: 3 | Status: SHIPPED | OUTPATIENT
Start: 2025-08-12

## 2025-08-13 ENCOUNTER — CLINICAL DOCUMENTATION (OUTPATIENT)
Dept: NEUROLOGY | Age: 50
End: 2025-08-13

## 2025-08-13 ENCOUNTER — OFFICE VISIT (OUTPATIENT)
Age: 50
End: 2025-08-13
Payer: MEDICAID

## 2025-08-13 VITALS — HEIGHT: 67 IN | BODY MASS INDEX: 36.57 KG/M2 | WEIGHT: 233 LBS

## 2025-08-13 DIAGNOSIS — M19.019 AC JOINT ARTHROPATHY: ICD-10-CM

## 2025-08-13 DIAGNOSIS — M75.41 IMPINGEMENT SYNDROME OF RIGHT SHOULDER: ICD-10-CM

## 2025-08-13 DIAGNOSIS — G89.29 CHRONIC RIGHT SHOULDER PAIN: ICD-10-CM

## 2025-08-13 DIAGNOSIS — M25.511 CHRONIC RIGHT SHOULDER PAIN: ICD-10-CM

## 2025-08-13 DIAGNOSIS — M67.911 TENDINOPATHY OF RIGHT ROTATOR CUFF: Primary | ICD-10-CM

## 2025-08-13 PROCEDURE — 99213 OFFICE O/P EST LOW 20 MIN: CPT

## 2025-08-13 RX ORDER — PILOCARPINE HYDROCHLORIDE 5 MG/1
5 TABLET, FILM COATED ORAL 3 TIMES DAILY
COMMUNITY
Start: 2025-08-05

## 2025-08-13 RX ORDER — PRASUGREL 10 MG/1
10 TABLET, FILM COATED ORAL EVERY MORNING
COMMUNITY

## 2025-08-13 RX ORDER — BUTALBITAL, ACETAMINOPHEN AND CAFFEINE 50; 325; 40 MG/1; MG/1; MG/1
1 TABLET ORAL EVERY 6 HOURS PRN
COMMUNITY
Start: 2025-07-29

## 2025-08-18 RX ORDER — HYDROXYZINE HYDROCHLORIDE 25 MG/1
25 TABLET, FILM COATED ORAL 4 TIMES DAILY PRN
Qty: 120 TABLET | Refills: 1 | Status: SHIPPED | OUTPATIENT
Start: 2025-08-18

## 2025-08-25 ENCOUNTER — OFFICE VISIT (OUTPATIENT)
Dept: ENT CLINIC | Age: 50
End: 2025-08-25
Payer: MEDICAID

## 2025-08-25 VITALS
SYSTOLIC BLOOD PRESSURE: 138 MMHG | DIASTOLIC BLOOD PRESSURE: 98 MMHG | BODY MASS INDEX: 36.88 KG/M2 | HEIGHT: 67 IN | WEIGHT: 235 LBS

## 2025-08-25 DIAGNOSIS — D89.89 AUTOIMMUNE DISORDER: Primary | ICD-10-CM

## 2025-08-25 PROCEDURE — 99204 OFFICE O/P NEW MOD 45 MIN: CPT | Performed by: OTOLARYNGOLOGY

## 2025-08-25 ASSESSMENT — ENCOUNTER SYMPTOMS
RESPIRATORY NEGATIVE: 1
EYES NEGATIVE: 1
GASTROINTESTINAL NEGATIVE: 1
ALLERGIC/IMMUNOLOGIC NEGATIVE: 1

## 2025-08-26 ENCOUNTER — PROCEDURE VISIT (OUTPATIENT)
Dept: ENT CLINIC | Age: 50
End: 2025-08-26

## 2025-08-26 VITALS
SYSTOLIC BLOOD PRESSURE: 140 MMHG | HEIGHT: 67 IN | BODY MASS INDEX: 36.88 KG/M2 | DIASTOLIC BLOOD PRESSURE: 90 MMHG | WEIGHT: 235 LBS

## 2025-08-26 DIAGNOSIS — D89.89 AUTOIMMUNE DISORDER: Primary | ICD-10-CM

## 2025-08-28 ENCOUNTER — HOSPITAL ENCOUNTER (OUTPATIENT)
Dept: INFUSION THERAPY | Age: 50
Setting detail: INFUSION SERIES
Discharge: HOME | End: 2025-08-28
Payer: MEDICAID

## 2025-08-28 VITALS
OXYGEN SATURATION: 100 % | HEART RATE: 83 BPM | SYSTOLIC BLOOD PRESSURE: 154 MMHG | RESPIRATION RATE: 18 BRPM | DIASTOLIC BLOOD PRESSURE: 94 MMHG

## 2025-08-28 DIAGNOSIS — G43.109 MIGRAINE WITH AURA AND WITHOUT STATUS MIGRAINOSUS, NOT INTRACTABLE: Primary | ICD-10-CM

## 2025-08-28 PROCEDURE — 6360000002 HC RX W HCPCS: Performed by: NURSE PRACTITIONER

## 2025-08-28 PROCEDURE — 2500000003 HC RX 250 WO HCPCS: Performed by: NURSE PRACTITIONER

## 2025-08-28 PROCEDURE — 96365 THER/PROPH/DIAG IV INF INIT: CPT

## 2025-08-28 PROCEDURE — 2580000003 HC RX 258: Performed by: NURSE PRACTITIONER

## 2025-08-28 RX ORDER — ALBUTEROL SULFATE 90 UG/1
4 INHALANT RESPIRATORY (INHALATION) PRN
OUTPATIENT
Start: 2025-11-20

## 2025-08-28 RX ORDER — SODIUM CHLORIDE 0.9 % (FLUSH) 0.9 %
5-40 SYRINGE (ML) INJECTION PRN
OUTPATIENT
Start: 2025-11-20

## 2025-08-28 RX ORDER — DIPHENHYDRAMINE HYDROCHLORIDE 50 MG/ML
50 INJECTION, SOLUTION INTRAMUSCULAR; INTRAVENOUS
OUTPATIENT
Start: 2025-11-20

## 2025-08-28 RX ORDER — SODIUM CHLORIDE 9 MG/ML
5-250 INJECTION, SOLUTION INTRAVENOUS PRN
OUTPATIENT
Start: 2025-11-20

## 2025-08-28 RX ORDER — SODIUM CHLORIDE 0.9 % (FLUSH) 0.9 %
5-40 SYRINGE (ML) INJECTION PRN
Status: DISCONTINUED | OUTPATIENT
Start: 2025-08-28 | End: 2025-08-29 | Stop reason: HOSPADM

## 2025-08-28 RX ORDER — ONDANSETRON 2 MG/ML
8 INJECTION INTRAMUSCULAR; INTRAVENOUS
OUTPATIENT
Start: 2025-11-20

## 2025-08-28 RX ORDER — HYDROCORTISONE SODIUM SUCCINATE 100 MG/2ML
100 INJECTION INTRAMUSCULAR; INTRAVENOUS
OUTPATIENT
Start: 2025-11-20

## 2025-08-28 RX ORDER — EPINEPHRINE 1 MG/ML
0.3 INJECTION, SOLUTION, CONCENTRATE INTRAVENOUS PRN
OUTPATIENT
Start: 2025-11-20

## 2025-08-28 RX ORDER — SODIUM CHLORIDE 9 MG/ML
INJECTION, SOLUTION INTRAVENOUS CONTINUOUS
OUTPATIENT
Start: 2025-11-20

## 2025-08-28 RX ORDER — ACETAMINOPHEN 325 MG/1
650 TABLET ORAL
OUTPATIENT
Start: 2025-11-20

## 2025-08-28 RX ADMIN — EPTINEZUMAB-JJMR 300 MG: 100 INJECTION INTRAVENOUS at 14:22

## 2025-08-28 RX ADMIN — SODIUM CHLORIDE, PRESERVATIVE FREE 10 ML: 5 INJECTION INTRAVENOUS at 14:52
